# Patient Record
Sex: MALE | Race: WHITE | NOT HISPANIC OR LATINO | Employment: OTHER
[De-identification: names, ages, dates, MRNs, and addresses within clinical notes are randomized per-mention and may not be internally consistent; named-entity substitution may affect disease eponyms.]

---

## 2017-01-09 ENCOUNTER — PRIOR ORIGINAL RECORDS (OUTPATIENT)
Dept: OTHER | Age: 72
End: 2017-01-09

## 2017-01-30 RX ORDER — AMANTADINE HYDROCHLORIDE 100 MG/1
CAPSULE, GELATIN COATED ORAL
Qty: 60 CAPSULE | Refills: 3 | Status: SHIPPED | OUTPATIENT
Start: 2017-01-30 | End: 2018-09-14

## 2017-01-30 NOTE — TELEPHONE ENCOUNTER
Medication: Amantadine    Date of last refill: 07/21/16 with 5 addt refills  [place]     Last office visit: 07/21/16  Due back to clinic per last office note:  RTN in 6 months by 01/21/17  Date next office visit schedule

## 2017-02-16 ENCOUNTER — OFFICE VISIT (OUTPATIENT)
Dept: NEUROLOGY | Facility: CLINIC | Age: 72
End: 2017-02-16

## 2017-02-16 VITALS
RESPIRATION RATE: 16 BRPM | WEIGHT: 231 LBS | SYSTOLIC BLOOD PRESSURE: 138 MMHG | DIASTOLIC BLOOD PRESSURE: 86 MMHG | HEART RATE: 74 BPM | BODY MASS INDEX: 33.45 KG/M2 | HEIGHT: 69.5 IN

## 2017-02-16 DIAGNOSIS — G20 PARKINSONIAN TREMOR (HCC): Primary | ICD-10-CM

## 2017-02-16 PROCEDURE — 99213 OFFICE O/P EST LOW 20 MIN: CPT | Performed by: OTHER

## 2017-02-16 RX ORDER — ROPINIROLE 0.25 MG/1
0.25 TABLET, FILM COATED ORAL 3 TIMES DAILY
Qty: 90 TABLET | Refills: 3 | Status: SHIPPED | OUTPATIENT
Start: 2017-02-16 | End: 2017-04-06

## 2017-02-16 NOTE — PATIENT INSTRUCTIONS
Refill policies:    • Allow 2 business days for refills; controlled substances may take longer.   • Contact your pharmacy at least 5 days prior to running out of medication and have them send an electronic request or submit request through the “request re your physician has recommended that you have a procedure or additional testing performed. [facility]) will contact your insurance carrier to obtain pre-certification or prior authorization.     Unfortunately, JOSE has seen an increas

## 2017-02-16 NOTE — PROGRESS NOTES
[facility] with [address]  2/9/1945  Primary Care Provider:  Diandra Lynn MD    2/16/2017    74year old yo patient being seen for:  tremors    The new stuff did not help althoug Pulse 74  Resp 16  Ht 69.5\"  Wt 231 lb  BMI 33.64 kg/m2  Looks stated age  Pink conjunctiva anicteric sclerae, moist mucosa  No LAD, no thyromegaly  Lungs clear breath sounds  Heart S1S2, no abnormal sounds  Pink nailbeds no Cyanosis, pulses palpated    R

## 2017-04-06 RX ORDER — ROPINIROLE 0.25 MG/1
TABLET, FILM COATED ORAL
Qty: 270 TABLET | Refills: 1 | Status: SHIPPED | OUTPATIENT
Start: 2017-04-06 | End: 2017-09-25

## 2017-04-06 NOTE — TELEPHONE ENCOUNTER
Medication: Ropinorole    Date of last refill: 02/16/17 with 3 addt refills  [place]     Last office visit: 2/16/2017  Due back to clinic per last office note:  RTN in 6 months by 08/16/17  Date next office visit schedul

## 2017-04-11 ENCOUNTER — PRIOR ORIGINAL RECORDS (OUTPATIENT)
Dept: OTHER | Age: 72
End: 2017-04-11

## 2017-04-14 ENCOUNTER — TELEPHONE (OUTPATIENT)
Dept: NEUROLOGY | Facility: CLINIC | Age: 72
End: 2017-04-14

## 2017-04-24 ENCOUNTER — PRIOR ORIGINAL RECORDS (OUTPATIENT)
Dept: OTHER | Age: 72
End: 2017-04-24

## 2017-04-27 ENCOUNTER — TELEPHONE (OUTPATIENT)
Dept: NEUROLOGY | Facility: CLINIC | Age: 72
End: 2017-04-27

## 2017-04-27 ENCOUNTER — PRIOR ORIGINAL RECORDS (OUTPATIENT)
Dept: OTHER | Age: 72
End: 2017-04-27

## 2017-04-27 NOTE — TELEPHONE ENCOUNTER
Current Parkinson's patient with increasing tremors. Wife calling to update office and request a sooner evaluation appointment. Patient saw PCP office who instructed them to contact office and schedule follow up.     Patient with increasing resting trem

## 2017-05-02 NOTE — TELEPHONE ENCOUNTER
Wife calling again. Pt is having dizziness and they noticed that is a side effect of the Requip. They are wondering if they should stop the medication.   Please call as soon as possible, they have plans to go out of town next week but need to speak with yo

## 2017-05-04 ENCOUNTER — OFFICE VISIT (OUTPATIENT)
Dept: NEUROLOGY | Facility: CLINIC | Age: 72
End: 2017-05-04

## 2017-05-04 VITALS — DIASTOLIC BLOOD PRESSURE: 82 MMHG | HEART RATE: 82 BPM | SYSTOLIC BLOOD PRESSURE: 136 MMHG | RESPIRATION RATE: 18 BRPM

## 2017-05-04 DIAGNOSIS — G20 PARKINSONIAN TREMOR (HCC): Primary | ICD-10-CM

## 2017-05-04 DIAGNOSIS — G60.3 IDIOPATHIC PROGRESSIVE NEUROPATHY: ICD-10-CM

## 2017-05-04 PROCEDURE — 99213 OFFICE O/P EST LOW 20 MIN: CPT | Performed by: OTHER

## 2017-05-04 NOTE — PATIENT INSTRUCTIONS
Refill policies:    • Allow 2 business days for refills; controlled substances may take longer.   • Contact your pharmacy at least 5 days prior to running out of medication and have them send an electronic request or submit request through the “request re insurance carrier to obtain pre-certification or prior authorization. Unfortunately, JOSE has seen an increase in denial of payment even though the procedure/test has been pre-certified.   You are strongly encouraged to contact your insurance carrier to v

## 2017-05-04 NOTE — PROGRESS NOTES
[facility] with [address]  2/9/1945  Primary Care Provider:  Fanta Jaime MD    5/4/2017    74year old yo patient being seen for:  tremors    Now sees me because of \"dizziness\ medications, and the following relevant information are as noted in appropriate sections above and below.       EXAM:  /82 mmHg  Pulse 82  Resp 18  Looks stated age  Pink conjunctiva anicteric sclerae, moist mucosa  No LAD, no thyromegaly  Lungs clear

## 2017-05-09 NOTE — TELEPHONE ENCOUNTER
Medication: Carbidopa- Levodopa    Date of last refill: 05/23/16 with 1 addt refill # 90  [place]     Last office visit: 05/04/17  Due back to clinic per last office note:  RTN in 8 weeks 06/29/17  Date next office visit

## 2017-05-09 NOTE — TELEPHONE ENCOUNTER
Spoke to Nayan wife on [place] and she states that when [facility] came on office visit on 05/04/17. Per Dr Marilia Roberts. Patient to restart Carbidopa Levodopa.

## 2017-05-30 PROBLEM — R26.81 UNSTEADY GAIT: Status: ACTIVE | Noted: 2017-05-30

## 2017-05-31 ENCOUNTER — APPOINTMENT (OUTPATIENT)
Dept: LAB | Age: 72
End: 2017-05-31
Attending: Other
Payer: MEDICARE

## 2017-05-31 ENCOUNTER — OFFICE VISIT (OUTPATIENT)
Dept: NEUROLOGY | Facility: CLINIC | Age: 72
End: 2017-05-31

## 2017-05-31 DIAGNOSIS — G62.9 LARGE FIBER NEUROPATHY: Primary | ICD-10-CM

## 2017-05-31 DIAGNOSIS — G62.9 LARGE FIBER NEUROPATHY: ICD-10-CM

## 2017-05-31 PROCEDURE — 86431 RHEUMATOID FACTOR QUANT: CPT | Performed by: OTHER

## 2017-05-31 PROCEDURE — 95885 MUSC TST DONE W/NERV TST LIM: CPT | Performed by: OTHER

## 2017-05-31 PROCEDURE — 86334 IMMUNOFIX E-PHORESIS SERUM: CPT | Performed by: OTHER

## 2017-05-31 PROCEDURE — 86038 ANTINUCLEAR ANTIBODIES: CPT | Performed by: OTHER

## 2017-05-31 PROCEDURE — 84165 PROTEIN E-PHORESIS SERUM: CPT | Performed by: OTHER

## 2017-05-31 PROCEDURE — 36415 COLL VENOUS BLD VENIPUNCTURE: CPT | Performed by: OTHER

## 2017-05-31 PROCEDURE — 86140 C-REACTIVE PROTEIN: CPT | Performed by: OTHER

## 2017-05-31 PROCEDURE — 83883 ASSAY NEPHELOMETRY NOT SPEC: CPT | Performed by: OTHER

## 2017-05-31 PROCEDURE — 95909 NRV CNDJ TST 5-6 STUDIES: CPT | Performed by: OTHER

## 2017-05-31 PROCEDURE — 82607 VITAMIN B-12: CPT | Performed by: OTHER

## 2017-05-31 PROCEDURE — 85652 RBC SED RATE AUTOMATED: CPT | Performed by: OTHER

## 2017-05-31 PROCEDURE — 86255 FLUORESCENT ANTIBODY SCREEN: CPT | Performed by: OTHER

## 2017-05-31 NOTE — PROCEDURES
[facility] with [address]  [phone]    Fax  [phone]    Electrophysiologic Consultation      Patient: Asad Ly       5/31/201 Spontaneous MUAP Recruitment    IA Fib PSW Fasc H.F. Amp Dur.  PPP Pattern   R. TIB ANTERIOR N None None None None N N N N   L. PERON LONGUS N None None None None N N N N   R. GASTROCN (MED) N None None None None N N N N

## 2017-05-31 NOTE — PROGRESS NOTES
He is here for EMG to evaluate how bad his clinical neuropathy is. He complains of dizziness and has sensory ataxia legs  He also has bilateral knee replacement.   He says that his numbness in feet has been few weeks but in reality, he did not pay attentio

## 2017-07-06 ENCOUNTER — OFFICE VISIT (OUTPATIENT)
Dept: NEUROLOGY | Facility: CLINIC | Age: 72
End: 2017-07-06

## 2017-07-06 VITALS
DIASTOLIC BLOOD PRESSURE: 80 MMHG | BODY MASS INDEX: 33.3 KG/M2 | SYSTOLIC BLOOD PRESSURE: 125 MMHG | HEIGHT: 69.5 IN | RESPIRATION RATE: 16 BRPM | HEART RATE: 70 BPM | WEIGHT: 230 LBS

## 2017-07-06 DIAGNOSIS — R26.81 UNSTEADY GAIT: ICD-10-CM

## 2017-07-06 DIAGNOSIS — G20 PARKINSONIAN TREMOR (HCC): Primary | ICD-10-CM

## 2017-07-06 DIAGNOSIS — G62.9 LARGE FIBER NEUROPATHY: ICD-10-CM

## 2017-07-06 DIAGNOSIS — D89.89 KAPPA LIGHT CHAIN DISEASE (HCC): ICD-10-CM

## 2017-07-06 PROCEDURE — 99213 OFFICE O/P EST LOW 20 MIN: CPT | Performed by: OTHER

## 2017-07-06 NOTE — PROGRESS NOTES
[facility] with [address]  2/9/1945  Primary Care Provider:  Shirl Collet, MD    7/6/2017    74year old yo patient being seen for:  Parkinson disease with tremors and neuropathy - 12 mm/Hr    RHEUMATOID FACTOR      <15 IU/mL    C-REACTIVE PROTEIN      <1.00 mg/dL    Vitamin B12      193 - 986 pg/mL    YUKO SCREEN      Negative        Review of Systems:  A comprehensive 14 point review of systems was completed.   Pertinent positives no thyromegaly  Lungs clear breath sounds  Heart S1S2, no abnormal sounds  Pink nailbeds no Cyanosis, pulses palpated    Resting tremor with only a minor postural component  No bradykinesa  Slight truncal rigidity    Balance is OK      IMPRESSION & PLANS:

## 2017-07-06 NOTE — PATIENT INSTRUCTIONS
Refill policies:    • Allow 2-3 business days for refills; controlled substances may take longer.   • Contact your pharmacy at least 5 days prior to running out of medication and have them send an electronic request or submit request through the [facility] have a procedure or additional testing performed. [facility]) will contact your insurance carrier to obtain pre-certification or prior authorization.     Unfortunately, JOSE has seen an increase in denial of payment even though the p

## 2017-07-10 ENCOUNTER — APPOINTMENT (OUTPATIENT)
Dept: LAB | Age: 72
End: 2017-07-10
Attending: INTERNAL MEDICINE
Payer: MEDICARE

## 2017-07-10 PROCEDURE — 86335 IMMUNFIX E-PHORSIS/URINE/CSF: CPT

## 2017-07-10 PROCEDURE — 83883 ASSAY NEPHELOMETRY NOT SPEC: CPT

## 2017-07-10 PROCEDURE — 84156 ASSAY OF PROTEIN URINE: CPT

## 2017-07-10 PROCEDURE — 36415 COLL VENOUS BLD VENIPUNCTURE: CPT

## 2017-08-31 PROBLEM — D47.2 MGUS (MONOCLONAL GAMMOPATHY OF UNKNOWN SIGNIFICANCE): Status: ACTIVE | Noted: 2017-08-31

## 2017-08-31 PROBLEM — R76.8 ELEVATED SERUM IMMUNOGLOBULIN FREE LIGHT CHAIN LEVEL: Status: ACTIVE | Noted: 2017-08-31

## 2017-09-22 ENCOUNTER — APPOINTMENT (OUTPATIENT)
Dept: CT IMAGING | Age: 72
End: 2017-09-22
Attending: EMERGENCY MEDICINE
Payer: MEDICARE

## 2017-09-22 ENCOUNTER — HOSPITAL ENCOUNTER (EMERGENCY)
Age: 72
Discharge: HOME | End: 2017-09-22
Attending: EMERGENCY MEDICINE
Payer: MEDICARE

## 2017-09-22 VITALS
RESPIRATION RATE: 16 BRPM | BODY MASS INDEX: 31.5 KG/M2 | SYSTOLIC BLOOD PRESSURE: 135 MMHG | WEIGHT: 225 LBS | HEIGHT: 71 IN | OXYGEN SATURATION: 98 % | DIASTOLIC BLOOD PRESSURE: 90 MMHG | TEMPERATURE: 98 F | HEART RATE: 74 BPM

## 2017-09-22 DIAGNOSIS — R10.9 ABDOMINAL PAIN, RIGHT LATERAL: Primary | ICD-10-CM

## 2017-09-22 DIAGNOSIS — K40.90 UNILATERAL INGUINAL HERNIA WITHOUT OBSTRUCTION OR GANGRENE, RECURRENCE NOT SPECIFIED: ICD-10-CM

## 2017-09-22 LAB
ALBUMIN SERPL-MCNC: 3.8 G/DL (ref 3.5–4.8)
ALP LIVER SERPL-CCNC: 66 U/L (ref 45–117)
ALT SERPL-CCNC: 10 U/L (ref 17–63)
AST SERPL-CCNC: 13 U/L (ref 15–41)
BASOPHILS # BLD AUTO: 0.04 X10(3) UL (ref 0–0.1)
BASOPHILS NFR BLD AUTO: 0.5 %
BILIRUB SERPL-MCNC: 1.4 MG/DL (ref 0.1–2)
BILIRUB UR QL STRIP.AUTO: NEGATIVE
BUN BLD-MCNC: 15 MG/DL (ref 8–20)
CALCIUM BLD-MCNC: 8.6 MG/DL (ref 8.3–10.3)
CHLORIDE: 106 MMOL/L (ref 101–111)
CLARITY UR REFRACT.AUTO: CLEAR
CO2: 26 MMOL/L (ref 22–32)
COLOR UR AUTO: YELLOW
CREAT BLD-MCNC: 1.05 MG/DL (ref 0.7–1.3)
EOSINOPHIL # BLD AUTO: 0.1 X10(3) UL (ref 0–0.3)
EOSINOPHIL NFR BLD AUTO: 1.3 %
ERYTHROCYTE [DISTWIDTH] IN BLOOD BY AUTOMATED COUNT: 13.7 % (ref 11.5–16)
GLUCOSE BLD-MCNC: 100 MG/DL (ref 70–99)
GLUCOSE UR STRIP.AUTO-MCNC: NEGATIVE MG/DL
HCT VFR BLD AUTO: 48.2 % (ref 37–53)
HGB BLD-MCNC: 16.2 G/DL (ref 13–17)
IMMATURE GRANULOCYTE COUNT: 0.04 X10(3) UL (ref 0–1)
IMMATURE GRANULOCYTE RATIO %: 0.5 %
KETONES UR STRIP.AUTO-MCNC: NEGATIVE MG/DL
LEUKOCYTE ESTERASE UR QL STRIP.AUTO: NEGATIVE
LIPASE: 95 U/L (ref 73–393)
LYMPHOCYTES # BLD AUTO: 1.23 X10(3) UL (ref 0.9–4)
LYMPHOCYTES NFR BLD AUTO: 16.4 %
M PROTEIN MFR SERPL ELPH: 7.4 G/DL (ref 6.1–8.3)
MCH RBC QN AUTO: 29 PG (ref 27–33.2)
MCHC RBC AUTO-ENTMCNC: 33.6 G/DL (ref 31–37)
MCV RBC AUTO: 86.2 FL (ref 80–99)
MONOCYTES # BLD AUTO: 0.69 X10(3) UL (ref 0.1–0.6)
MONOCYTES NFR BLD AUTO: 9.2 %
NEUTROPHIL ABS PRELIM: 5.4 X10 (3) UL (ref 1.3–6.7)
NEUTROPHILS # BLD AUTO: 5.4 X10(3) UL (ref 1.3–6.7)
NEUTROPHILS NFR BLD AUTO: 72.1 %
NITRITE UR QL STRIP.AUTO: NEGATIVE
PH UR STRIP.AUTO: 6 [PH] (ref 4.5–8)
PLATELET # BLD AUTO: 181 10(3)UL (ref 150–450)
POTASSIUM SERPL-SCNC: 3.9 MMOL/L (ref 3.6–5.1)
PROT UR STRIP.AUTO-MCNC: NEGATIVE MG/DL
RBC # BLD AUTO: 5.59 X10(6)UL (ref 3.8–5.8)
RBC UR QL AUTO: NEGATIVE
RED CELL DISTRIBUTION WIDTH-SD: 43.3 FL (ref 35.1–46.3)
SODIUM SERPL-SCNC: 139 MMOL/L (ref 136–144)
SP GR UR STRIP.AUTO: 1.02 (ref 1–1.03)
UROBILINOGEN UR STRIP.AUTO-MCNC: 1 MG/DL
WBC # BLD AUTO: 7.5 X10(3) UL (ref 4–13)

## 2017-09-22 PROCEDURE — 36415 COLL VENOUS BLD VENIPUNCTURE: CPT

## 2017-09-22 PROCEDURE — 99284 EMERGENCY DEPT VISIT MOD MDM: CPT

## 2017-09-22 PROCEDURE — 74176 CT ABD & PELVIS W/O CONTRAST: CPT | Performed by: EMERGENCY MEDICINE

## 2017-09-22 PROCEDURE — 83690 ASSAY OF LIPASE: CPT | Performed by: EMERGENCY MEDICINE

## 2017-09-22 PROCEDURE — 85025 COMPLETE CBC W/AUTO DIFF WBC: CPT | Performed by: EMERGENCY MEDICINE

## 2017-09-22 PROCEDURE — 80053 COMPREHEN METABOLIC PANEL: CPT | Performed by: EMERGENCY MEDICINE

## 2017-09-22 PROCEDURE — 81003 URINALYSIS AUTO W/O SCOPE: CPT | Performed by: EMERGENCY MEDICINE

## 2017-09-22 NOTE — ED PROVIDER NOTES
Patient Seen in: THE [facility] Emergency Department In [place]    History   Patient presents with:  Abdomen/Flank Pain (GI/)    Stated Complaint: abd pain     HPI    68-year-old male who has a history of tremor in his right upper extremity.   He is treated equivalent: 1 - 2 per week     Comment: 1-2 weekly      Review of Systems    Positive for stated complaint: abd pain   Other systems are as noted in HPI. Constitutional and vital signs reviewed.       All other systems reviewed and negative except as noted Please view results for these tests on the individual orders.    URINALYSIS WITH CULTURE REFLEX   RAINBOW DRAW LAVENDER   RAINBOW DRAW LIGHT GREEN     Electrocardiogram as interpreted by this provider shows sinus rhythm and rate of 94 with no acute ST-T

## 2017-09-22 NOTE — ED INITIAL ASSESSMENT (HPI)
WHILE PLAYING GOLF THIS MORNING, EXPERIENCED SHARP RIGHT SIDED ABD PAIN AFTER TAKING A SWING. STATES PAIN ONLY WHEN TURNING THE MOTION OF A SWING.

## 2017-09-25 ENCOUNTER — OFFICE VISIT (OUTPATIENT)
Dept: SURGERY | Facility: CLINIC | Age: 72
End: 2017-09-25

## 2017-09-25 VITALS
HEIGHT: 71 IN | DIASTOLIC BLOOD PRESSURE: 91 MMHG | HEART RATE: 73 BPM | BODY MASS INDEX: 31.5 KG/M2 | RESPIRATION RATE: 16 BRPM | WEIGHT: 225 LBS | SYSTOLIC BLOOD PRESSURE: 134 MMHG

## 2017-09-25 DIAGNOSIS — K40.20 NON-RECURRENT BILATERAL INGUINAL HERNIA WITHOUT OBSTRUCTION OR GANGRENE: Primary | ICD-10-CM

## 2017-09-25 PROCEDURE — 99204 OFFICE O/P NEW MOD 45 MIN: CPT | Performed by: COLON & RECTAL SURGERY

## 2017-09-25 RX ORDER — ROPINIROLE 0.25 MG/1
TABLET, FILM COATED ORAL
Qty: 270 TABLET | Refills: 1 | Status: SHIPPED | OUTPATIENT
Start: 2017-09-25 | End: 2019-04-02

## 2017-09-25 NOTE — H&P
New Patient Visit Note       Active Problems      1.  Non-recurrent bilateral inguinal hernia without obstruction or gangrene        Chief Complaint   Right lower quadrant abdominal pain    History of Present Illness   Ted Duffy is a 49-year-old gentlem obstruction and other lower urinary tract symptoms (LUTS)    • Impotence of organic origin    • KIDNEY STONE    • MGUS (monoclonal gammopathy of unknown significance) 8/31/2017   • Nonspecific abnormal results of cardiovascular function study 11/2006    Ca 90 tablet Rfl: 0   FINASTERIDE 5 MG Oral Tab Take 1 tablet by mouth  daily Disp: 90 tablet Rfl: 0   CARBIDOPA-LEVODOPA  MG Oral Tab TAKE 1 TABLET BY MOUTH THREE TIMES DAILY Disp: 90 tablet Rfl: 5   AMANTADINE  MG Oral Cap TAKE 1 CAPSULE(100 MG well kempt, elderly, obese  Eyes: PERRL, no scleral icterus  ENT: Nares moist, oropharynx clear  Neck: Supple, no tracheal deviation   Cardiac: Irregularly irregular rate and rhythm, no murmurs  Respiratory: No respiratory distress, breath sounds clear stacy gallbladder is noted. PANCREAS:  Normal.  No lesion, fluid collection, ductal dilatation, or atrophy. SPLEEN:  Calcified granulomas and spleen are noted. KIDNEYS:  7 mm and 6 mm nonobstructing calculi left kidney are noted.  5 mm calculus nonobstructin abdomen and pelvis obtained on 9/22/2017. I agree with the radiologist interpretation. Are bilateral inguinal hernias. The right is greater than left.   Right inguinal hernia appears to contain a portion of the cecum and possibly the ileocecal valve, lef Referrals   None    Follow Up  Return for Follow up after procedure.     Dinh Caro MD

## 2017-09-25 NOTE — TELEPHONE ENCOUNTER
Medication: Ropinirole 0.25 mg    Date of last refill: 04/06/17 with 1 addt refill # 270  [place]     Last office visit: 7/6/2017  Due back to clinic per last office note:  RTN in 6 months  Date next office visit schedul

## 2017-09-25 NOTE — PATIENT INSTRUCTIONS
Assessment   Non-recurrent bilateral inguinal hernia without obstruction or gangrene  (primary encounter diagnosis)  I personally reviewed the images of the CT of the abdomen and pelvis obtained on 9/22/2017. I agree with the radiologist interpretation.

## 2017-09-28 ENCOUNTER — PRIOR ORIGINAL RECORDS (OUTPATIENT)
Dept: OTHER | Age: 72
End: 2017-09-28

## 2017-12-04 NOTE — TELEPHONE ENCOUNTER
Medication: Carbidopa-Levodopa  mg     Date of last refill: 05/09/17 with 5 addt refills  [place]      Last office visit: 7/6/2017  Due back to clinic per last office note:  RTN in 6 months  Date next office visit

## 2017-12-18 ENCOUNTER — OFFICE VISIT (OUTPATIENT)
Dept: NEUROLOGY | Facility: CLINIC | Age: 72
End: 2017-12-18

## 2017-12-18 VITALS
RESPIRATION RATE: 16 BRPM | DIASTOLIC BLOOD PRESSURE: 80 MMHG | BODY MASS INDEX: 31.5 KG/M2 | HEART RATE: 82 BPM | SYSTOLIC BLOOD PRESSURE: 138 MMHG | HEIGHT: 71 IN | WEIGHT: 225 LBS

## 2017-12-18 DIAGNOSIS — G20 PARKINSONIAN TREMOR (HCC): Primary | ICD-10-CM

## 2017-12-18 DIAGNOSIS — R26.81 UNSTEADY GAIT: ICD-10-CM

## 2017-12-18 PROCEDURE — 99214 OFFICE O/P EST MOD 30 MIN: CPT | Performed by: OTHER

## 2017-12-18 RX ORDER — ROPINIROLE 0.5 MG/1
0.5 TABLET, FILM COATED ORAL 3 TIMES DAILY
Qty: 270 TABLET | Refills: 4 | Status: SHIPPED | OUTPATIENT
Start: 2017-12-18 | End: 2018-01-31 | Stop reason: DRUGHIGH

## 2017-12-18 NOTE — PROGRESS NOTES
[employer] with [address]  2/9/1945  Primary Care Provider:  Merissa Berry MD    12/18/2017    74year old yo patient being seen for:  PD with prominent resting tremor    No falls Looks stated age  Pink conjunctiva anicteric sclerae, moist mucosa  No LAD, no thyromegaly  Lungs clear breath sounds  Heart S1S2, no abnormal sounds  Pink nailbeds no Cyanosis, pulses palpated    Resting tremor  Not much bradykinesia  Stooped posture  T

## 2018-01-29 ENCOUNTER — TELEPHONE (OUTPATIENT)
Dept: NEUROLOGY | Facility: CLINIC | Age: 73
End: 2018-01-29

## 2018-01-29 NOTE — TELEPHONE ENCOUNTER
Wife states that patient's tremors are actually worsened. Asking to increase Ropinirole dose. He is also having delusions once weekly which is new. Patient is accusing her of having an affair. Supportive conversation had.   Will mention this the

## 2018-01-29 NOTE — TELEPHONE ENCOUNTER
LMTCB    Patient is on Ropinirole 0.5mg, per last office visit on 12/18 with Dr. Celina Hicks may increase dosage to 1mg. LM to obtain patients symptoms.

## 2018-01-30 NOTE — TELEPHONE ENCOUNTER
Spoke with wife and advised to reduce ropinirole back to 0.25 mg 3 times a day. Tremors did not change. His ability to stand and walk is still steady anyway.     Theodora Esparza MD  Vascular & General Neurology  Director, Multiple Sclerosis Program  [facility]

## 2018-02-01 ENCOUNTER — PRIOR ORIGINAL RECORDS (OUTPATIENT)
Dept: OTHER | Age: 73
End: 2018-02-01

## 2018-02-01 PROCEDURE — 83883 ASSAY NEPHELOMETRY NOT SPEC: CPT | Performed by: INTERNAL MEDICINE

## 2018-02-01 PROCEDURE — 86334 IMMUNOFIX E-PHORESIS SERUM: CPT | Performed by: INTERNAL MEDICINE

## 2018-02-01 PROCEDURE — 84165 PROTEIN E-PHORESIS SERUM: CPT | Performed by: INTERNAL MEDICINE

## 2018-02-06 ENCOUNTER — APPOINTMENT (OUTPATIENT)
Dept: LAB | Facility: HOSPITAL | Age: 73
End: 2018-02-06
Payer: MEDICARE

## 2018-02-06 DIAGNOSIS — K40.90 NON-RECURRENT UNILATERAL INGUINAL HERNIA WITHOUT OBSTRUCTION OR GANGRENE: ICD-10-CM

## 2018-02-06 LAB
ATRIAL RATE: 357 BPM
BUN BLD-MCNC: 16 MG/DL (ref 8–20)
CALCIUM BLD-MCNC: 9 MG/DL (ref 8.3–10.3)
CHLORIDE: 106 MMOL/L (ref 101–111)
CO2: 29 MMOL/L (ref 22–32)
CREAT BLD-MCNC: 1.08 MG/DL (ref 0.7–1.3)
GLUCOSE BLD-MCNC: 107 MG/DL (ref 70–99)
POTASSIUM SERPL-SCNC: 4.1 MMOL/L (ref 3.6–5.1)
Q-T INTERVAL: 372 MS
QRS DURATION: 84 MS
QTC CALCULATION (BEZET): 404 MS
R AXIS: 67 DEGREES
SODIUM SERPL-SCNC: 141 MMOL/L (ref 136–144)
T AXIS: 3 DEGREES
VENTRICULAR RATE: 71 BPM

## 2018-02-06 PROCEDURE — 36415 COLL VENOUS BLD VENIPUNCTURE: CPT

## 2018-02-06 PROCEDURE — 80048 BASIC METABOLIC PNL TOTAL CA: CPT

## 2018-02-06 PROCEDURE — 93005 ELECTROCARDIOGRAM TRACING: CPT

## 2018-02-06 PROCEDURE — 93010 ELECTROCARDIOGRAM REPORT: CPT | Performed by: INTERNAL MEDICINE

## 2018-02-13 ENCOUNTER — PRIOR ORIGINAL RECORDS (OUTPATIENT)
Dept: OTHER | Age: 73
End: 2018-02-13

## 2018-02-19 LAB
ALBUMIN: 4.1 G/DL
ALKALINE PHOSPHATATE(ALK PHOS): 59 IU/L
BILIRUBIN TOTAL: 1.25 MG/DL
BUN: 15 MG/DL
CALCIUM: 9.3 MG/DL
CHLORIDE: 103 MEQ/L
CREATININE, SERUM: 1 MG/DL
GLUCOSE: 99 MG/DL
HEMATOCRIT: 48.9 %
HEMOGLOBIN: 16.6 G/DL
PLATELETS: 202 K/UL
POTASSIUM, SERUM: 4.2 MEQ/L
PROTEIN, TOTAL: 7 G/DL
RED BLOOD COUNT: 5.61 X 10-6/U
SGOT (AST): 18 IU/L
SGPT (ALT): 11 IU/L
SODIUM: 142 MEQ/L
WHITE BLOOD COUNT: 7.7 X 10-3/U

## 2018-03-26 ENCOUNTER — SURGERY (OUTPATIENT)
Age: 73
End: 2018-03-26

## 2018-03-26 ENCOUNTER — ANESTHESIA (OUTPATIENT)
Dept: SURGERY | Facility: HOSPITAL | Age: 73
End: 2018-03-26
Payer: MEDICARE

## 2018-03-26 ENCOUNTER — HOSPITAL ENCOUNTER (OUTPATIENT)
Facility: HOSPITAL | Age: 73
Setting detail: HOSPITAL OUTPATIENT SURGERY
Discharge: HOME | End: 2018-03-26
Attending: SURGERY | Admitting: SURGERY
Payer: MEDICARE

## 2018-03-26 ENCOUNTER — ANESTHESIA EVENT (OUTPATIENT)
Dept: SURGERY | Facility: HOSPITAL | Age: 73
End: 2018-03-26
Payer: MEDICARE

## 2018-03-26 VITALS
HEIGHT: 71 IN | WEIGHT: 222 LBS | BODY MASS INDEX: 31.08 KG/M2 | RESPIRATION RATE: 17 BRPM | TEMPERATURE: 98 F | SYSTOLIC BLOOD PRESSURE: 108 MMHG | OXYGEN SATURATION: 96 % | DIASTOLIC BLOOD PRESSURE: 70 MMHG | HEART RATE: 56 BPM

## 2018-03-26 DIAGNOSIS — K40.90 NON-RECURRENT UNILATERAL INGUINAL HERNIA WITHOUT OBSTRUCTION OR GANGRENE: ICD-10-CM

## 2018-03-26 PROCEDURE — 8E0W4CZ ROBOTIC ASSISTED PROCEDURE OF TRUNK REGION, PERCUTANEOUS ENDOSCOPIC APPROACH: ICD-10-PCS | Performed by: SURGERY

## 2018-03-26 PROCEDURE — 0YU54JZ SUPPLEMENT RIGHT INGUINAL REGION WITH SYNTHETIC SUBSTITUTE, PERCUTANEOUS ENDOSCOPIC APPROACH: ICD-10-PCS | Performed by: SURGERY

## 2018-03-26 DEVICE — PROGRIP MESH: Type: IMPLANTABLE DEVICE | Site: INGUINAL | Status: FUNCTIONAL

## 2018-03-26 RX ORDER — NALOXONE HYDROCHLORIDE 0.4 MG/ML
80 INJECTION, SOLUTION INTRAMUSCULAR; INTRAVENOUS; SUBCUTANEOUS AS NEEDED
Status: DISCONTINUED | OUTPATIENT
Start: 2018-03-26 | End: 2018-03-26

## 2018-03-26 RX ORDER — SODIUM CHLORIDE, SODIUM LACTATE, POTASSIUM CHLORIDE, CALCIUM CHLORIDE 600; 310; 30; 20 MG/100ML; MG/100ML; MG/100ML; MG/100ML
INJECTION, SOLUTION INTRAVENOUS CONTINUOUS
Status: DISCONTINUED | OUTPATIENT
Start: 2018-03-26 | End: 2018-03-26

## 2018-03-26 RX ORDER — HYDROCODONE BITARTRATE AND ACETAMINOPHEN 5; 325 MG/1; MG/1
2 TABLET ORAL AS NEEDED
Status: COMPLETED | OUTPATIENT
Start: 2018-03-26 | End: 2018-03-26

## 2018-03-26 RX ORDER — MEPERIDINE HYDROCHLORIDE 25 MG/ML
INJECTION INTRAMUSCULAR; INTRAVENOUS; SUBCUTANEOUS
Status: COMPLETED
Start: 2018-03-26 | End: 2018-03-26

## 2018-03-26 RX ORDER — HYDROCODONE BITARTRATE AND ACETAMINOPHEN 5; 325 MG/1; MG/1
1-2 TABLET ORAL EVERY 6 HOURS PRN
Qty: 30 TABLET | Refills: 0 | Status: SHIPPED | OUTPATIENT
Start: 2018-03-26 | End: 2018-08-02

## 2018-03-26 RX ORDER — MIDAZOLAM HYDROCHLORIDE 1 MG/ML
1 INJECTION INTRAMUSCULAR; INTRAVENOUS EVERY 5 MIN PRN
Status: DISCONTINUED | OUTPATIENT
Start: 2018-03-26 | End: 2018-03-26

## 2018-03-26 RX ORDER — ONDANSETRON 2 MG/ML
4 INJECTION INTRAMUSCULAR; INTRAVENOUS AS NEEDED
Status: DISCONTINUED | OUTPATIENT
Start: 2018-03-26 | End: 2018-03-26

## 2018-03-26 RX ORDER — HEPARIN SODIUM 5000 [USP'U]/ML
5000 INJECTION, SOLUTION INTRAVENOUS; SUBCUTANEOUS ONCE
Status: COMPLETED | OUTPATIENT
Start: 2018-03-26 | End: 2018-03-26

## 2018-03-26 RX ORDER — CEFAZOLIN SODIUM/WATER 2 G/20 ML
2 SYRINGE (ML) INTRAVENOUS ONCE
Status: DISCONTINUED | OUTPATIENT
Start: 2018-03-26 | End: 2018-03-26 | Stop reason: HOSPADM

## 2018-03-26 RX ORDER — MEPERIDINE HYDROCHLORIDE 25 MG/ML
12.5 INJECTION INTRAMUSCULAR; INTRAVENOUS; SUBCUTANEOUS AS NEEDED
Status: COMPLETED | OUTPATIENT
Start: 2018-03-26 | End: 2018-03-26

## 2018-03-26 RX ORDER — BUPIVACAINE HYDROCHLORIDE AND EPINEPHRINE 5; 5 MG/ML; UG/ML
INJECTION, SOLUTION EPIDURAL; INTRACAUDAL; PERINEURAL AS NEEDED
Status: DISCONTINUED | OUTPATIENT
Start: 2018-03-26 | End: 2018-03-26 | Stop reason: HOSPADM

## 2018-03-26 RX ORDER — HYDROCODONE BITARTRATE AND ACETAMINOPHEN 5; 325 MG/1; MG/1
1 TABLET ORAL AS NEEDED
Status: COMPLETED | OUTPATIENT
Start: 2018-03-26 | End: 2018-03-26

## 2018-03-26 NOTE — ANESTHESIA PREPROCEDURE EVALUATION
PRE-OP EVALUATION    Patient Name: Joe Lemus    Pre-op Diagnosis: Non-recurrent unilateral inguinal hernia without obstruction or gangrene [K40.90]    Procedure(s):  ROBOTIC ASSISTED RIGHT INGUINAL HERNIA REPAIR WITH MESH. POSSIBLE LEFT INGUINAL HERNIA Neuro/Psych                              MGUS  parkinsons      Past Surgical History:  06/2014: CARDIOVERSION  11/28/2005: COLONOSCOPY  10/14/2009: KNEE REPLACEMENT SURGERY      Comment: left knee  12/2/2009: KNEE REPLACEMENT SURGERY      Comment: right

## 2018-03-26 NOTE — OPERATIVE REPORT
Report of 10 [facility] Patient Status:  Hospital Outpatient Surgery    1945 MRN [id number]   [facility] SURGERY Attending Andrea Nichols MD   [facility] Day # 0 PCP Pedro Pablo Wilkinson MD       3/26/2018    [address] [facility] [address] all ports were removed. Skin incisions were closed with 4-0 Vicryl in a subcuticular fashion. The wounds were infiltrated with local anesthesia, Steri-Strips, sterilely dressed. The patient tolerated the procedure well.     Chastity Freitas  3/26/2018

## 2018-03-26 NOTE — H&P
HPI:     Jordan Paredes is a 74year old male who presents for evaluation of a bilateral inguinal hernia. Patient describes a bulge and mild discomfort especially with physical activity.  Patient denies any symptoms of obstruction or strangulation.      Liz 0.25 MG Oral Tab TAKE 1 TABLET BY MOUTH 3  TIMES DAILY Disp: 270 tablet Rfl: 1   AMANTADINE  MG Oral Cap TAKE 1 CAPSULE(100 MG) BY MOUTH TWICE DAILY Disp: 60 capsule Rfl: 3   Metoprolol Succinate ER 25 MG Oral Tablet 24 Hr Take 1 tablet (25 mg total recurrent hernia,conversion to an open procedure, shoulder pain, DVT, PE and MI.  We also discussed the possibility of contralateral hernia repair if discovered incidentally at the time of surgery.     Hernia surgery booklet given to patient and questions a

## 2018-03-26 NOTE — ANESTHESIA POSTPROCEDURE EVALUATION
[facility]    Karena Montgomeryrajni Patient Status:  Hospital Outpatient Surgery   Age/Gender 74year old male MRN [id number]   Location [address] Attending Caryn Ferrer MD   Hosp Day # 0 PCP Yuri Vergara MD       Anesthesia Po

## 2018-05-10 ENCOUNTER — PRIOR ORIGINAL RECORDS (OUTPATIENT)
Dept: OTHER | Age: 73
End: 2018-05-10

## 2018-05-23 ENCOUNTER — LAB ENCOUNTER (OUTPATIENT)
Dept: LAB | Age: 73
End: 2018-05-23
Attending: INTERNAL MEDICINE
Payer: MEDICARE

## 2018-05-23 ENCOUNTER — PRIOR ORIGINAL RECORDS (OUTPATIENT)
Dept: OTHER | Age: 73
End: 2018-05-23

## 2018-05-23 DIAGNOSIS — I10 ESSENTIAL HYPERTENSION, BENIGN: ICD-10-CM

## 2018-05-23 DIAGNOSIS — R79.89 HYPOURICEMIA: ICD-10-CM

## 2018-05-23 DIAGNOSIS — I65.29 CAROTID ARTERY STENOSIS: ICD-10-CM

## 2018-05-23 DIAGNOSIS — E78.00 PURE HYPERCHOLESTEROLEMIA: Primary | ICD-10-CM

## 2018-05-23 DIAGNOSIS — I48.20 CHRONIC ATRIAL FIBRILLATION (HCC): ICD-10-CM

## 2018-05-23 PROCEDURE — 80053 COMPREHEN METABOLIC PANEL: CPT

## 2018-05-23 PROCEDURE — 83036 HEMOGLOBIN GLYCOSYLATED A1C: CPT

## 2018-05-23 PROCEDURE — 80061 LIPID PANEL: CPT

## 2018-05-31 ENCOUNTER — PRIOR ORIGINAL RECORDS (OUTPATIENT)
Dept: OTHER | Age: 73
End: 2018-05-31

## 2018-05-31 LAB
ALBUMIN: 3.7 G/DL
ALKALINE PHOSPHATATE(ALK PHOS): 73 IU/L
BILIRUBIN TOTAL: 0.9 MG/DL
BUN: 16 MG/DL
CALCIUM: 9.3 MG/DL
CHLORIDE: 105 MEQ/L
CHOLESTEROL, TOTAL: 158 MG/DL
CREATININE, SERUM: 1.08 MG/DL
GLUCOSE: 99 MG/DL
HDL CHOLESTEROL: 51 MG/DL
HEMOGLOBIN A1C: 5.6 %
LDL CHOLESTEROL: 90 MG/DL
NON-HDL CHOLESTEROL: 107 MG/DL
POTASSIUM, SERUM: 3.9 MEQ/L
PROTEIN, TOTAL: 7.3 G/DL
SGOT (AST): 19 IU/L
SGPT (ALT): 22 IU/L
SODIUM: 140 MEQ/L
TRIGLYCERIDES: 84 MG/DL

## 2018-06-11 NOTE — TELEPHONE ENCOUNTER
Medication: Carbidopa-Levodopa 25-100mg    Date of last refill: 12/04/17 with 5 addt refills  [place]     Last office visit: 12/18/2017  Due back to clinic per last office note:  RTN in 7 months  Date next office visit s Return in about 7 months (around 7/18/2018).

## 2018-06-12 ENCOUNTER — OFFICE VISIT (OUTPATIENT)
Dept: NEUROLOGY | Facility: CLINIC | Age: 73
End: 2018-06-12

## 2018-06-12 ENCOUNTER — TELEPHONE (OUTPATIENT)
Dept: NEUROLOGY | Facility: CLINIC | Age: 73
End: 2018-06-12

## 2018-06-12 VITALS
BODY MASS INDEX: 30.38 KG/M2 | HEIGHT: 71 IN | HEART RATE: 84 BPM | WEIGHT: 217 LBS | SYSTOLIC BLOOD PRESSURE: 127 MMHG | RESPIRATION RATE: 16 BRPM | DIASTOLIC BLOOD PRESSURE: 74 MMHG

## 2018-06-12 DIAGNOSIS — G20 PARKINSONIAN TREMOR (HCC): ICD-10-CM

## 2018-06-12 DIAGNOSIS — R26.81 UNSTEADY GAIT: Primary | ICD-10-CM

## 2018-06-12 PROCEDURE — 99214 OFFICE O/P EST MOD 30 MIN: CPT | Performed by: OTHER

## 2018-06-12 NOTE — PATIENT INSTRUCTIONS
Refill policies:    • Allow 2-3 business days for refills; controlled substances may take longer.   • Contact your pharmacy at least 5 days prior to running out of medication and have them send an electronic request or submit request through the “request re entire amount billed. Precertification and Prior Authorizations: If your physician has recommended that you have a procedure or additional testing performed.   [facility]) will contact your insurance carrier to obtain pre-certi

## 2018-06-12 NOTE — PROGRESS NOTES
[facility] with [address]  2/9/1945  Primary Care Provider:  Emily Antoine MD    6/12/2018  Accompanied visit:  ( ) No (wife) yes    74year old yo patient being seen for:  PD appropriate sections above and below.       EXAM:  /74 (BP Location: Left arm, Patient Position: Sitting, Cuff Size: large)   Pulse 84   Resp 16   Ht 71\"   Wt 217 lb   BMI 30.27 kg/m²   Looks stated age  Pink conjunctiva anicteric sclerae, moist muco other records reviewed --non F2F  (  ) [facility] meetings - patient not present --non F2F  Non Face to Face CPT code 78505/84631 applies as documented above    PROCEDURE DONE     (   ) see notes  Visits are done with exam room open doors and windows except when

## 2018-06-12 NOTE — TELEPHONE ENCOUNTER
From completed for patient. To be reviewed and signed by provider. Will mail to  when completed. Copy to scanning.

## 2018-06-14 ENCOUNTER — TELEPHONE (OUTPATIENT)
Dept: NEUROLOGY | Facility: CLINIC | Age: 73
End: 2018-06-14

## 2018-06-14 NOTE — TELEPHONE ENCOUNTER
PT initial examination received from [facility] for physician review and signature. Patient receives PT for Parkinson's disease. Frequency: twice weekly    Duration: 12 weeks, 6/12/2018 to 9/3/2018    Plan of Care:  Therapy to address poor balanc

## 2018-07-03 ENCOUNTER — PRIOR ORIGINAL RECORDS (OUTPATIENT)
Dept: OTHER | Age: 73
End: 2018-07-03

## 2018-07-10 ENCOUNTER — PRIOR ORIGINAL RECORDS (OUTPATIENT)
Dept: OTHER | Age: 73
End: 2018-07-10

## 2018-07-12 DIAGNOSIS — N40.0 BENIGN PROSTATIC HYPERPLASIA WITHOUT LOWER URINARY TRACT SYMPTOMS: Primary | Chronic | ICD-10-CM

## 2018-07-12 NOTE — TELEPHONE ENCOUNTER
Medication: Tamsulosin 0.4 mg    Date of last refill: 11/01/16 with 3 addt refills  [place]     Last office visit: 6/12/2018  Due back to clinic per last office note:  RTN in 3 months  Date next office visit scheduled:

## 2018-07-13 RX ORDER — TAMSULOSIN HYDROCHLORIDE 0.4 MG/1
0.4 CAPSULE ORAL DAILY
Qty: 90 CAPSULE | Refills: 0 | Status: SHIPPED | OUTPATIENT
Start: 2018-07-13 | End: 2018-09-24

## 2018-07-18 ENCOUNTER — TELEPHONE (OUTPATIENT)
Dept: NEUROLOGY | Facility: CLINIC | Age: 73
End: 2018-07-18

## 2018-07-18 NOTE — TELEPHONE ENCOUNTER
PT progress note reviewed by Dr. Mirela Madrid and signed. Plan is to continue PT 2x per week for the next 12 weeks. Was faxed by alternative staff member, confirmation received.

## 2018-08-02 ENCOUNTER — PRIOR ORIGINAL RECORDS (OUTPATIENT)
Dept: OTHER | Age: 73
End: 2018-08-02

## 2018-08-02 PROCEDURE — 86334 IMMUNOFIX E-PHORESIS SERUM: CPT | Performed by: PHYSICIAN ASSISTANT

## 2018-08-02 PROCEDURE — 84165 PROTEIN E-PHORESIS SERUM: CPT | Performed by: PHYSICIAN ASSISTANT

## 2018-08-02 PROCEDURE — 83883 ASSAY NEPHELOMETRY NOT SPEC: CPT | Performed by: PHYSICIAN ASSISTANT

## 2018-08-08 ENCOUNTER — PRIOR ORIGINAL RECORDS (OUTPATIENT)
Dept: OTHER | Age: 73
End: 2018-08-08

## 2018-09-13 ENCOUNTER — OFFICE VISIT (OUTPATIENT)
Dept: NEUROLOGY | Facility: CLINIC | Age: 73
End: 2018-09-13
Payer: COMMERCIAL

## 2018-09-13 VITALS
HEART RATE: 74 BPM | DIASTOLIC BLOOD PRESSURE: 78 MMHG | BODY MASS INDEX: 30.24 KG/M2 | HEIGHT: 71 IN | RESPIRATION RATE: 16 BRPM | SYSTOLIC BLOOD PRESSURE: 126 MMHG | WEIGHT: 216 LBS

## 2018-09-13 DIAGNOSIS — G20 PARKINSON DISEASE (HCC): Primary | ICD-10-CM

## 2018-09-13 PROBLEM — G20.A1 PARKINSON DISEASE (HCC): Status: ACTIVE | Noted: 2018-09-13

## 2018-09-13 PROCEDURE — 99214 OFFICE O/P EST MOD 30 MIN: CPT | Performed by: OTHER

## 2018-09-13 RX ORDER — ZONISAMIDE 25 MG/1
25 CAPSULE ORAL 2 TIMES DAILY
Qty: 60 CAPSULE | Refills: 5 | Status: SHIPPED | OUTPATIENT
Start: 2018-09-13 | End: 2019-03-04

## 2018-09-13 NOTE — PATIENT INSTRUCTIONS
Refill policies:    • Allow 2-3 business days for refills; controlled substances may take longer.   • Contact your pharmacy at least 5 days prior to running out of medication and have them send an electronic request or submit request through the “request re entire amount billed. Precertification and Prior Authorizations: If your physician has recommended that you have a procedure or additional testing performed.   [facility]) will contact your insurance carrier to obtain pre-certi

## 2018-09-13 NOTE — PROGRESS NOTES
[facility] with [address]  2/9/1945  Primary Care Provider:  Fanta Jaime MD    9/13/2018  Accompanied visit:  ( ) No (x) yes    74year old yo patient being seen for:  PD    No following items were reviewed: medications, and the following relevant information are as noted in appropriate sections above and below.       EXAM:  /78 (BP Location: Left arm, Patient Position: Sitting, Cuff Size: adult)   Pulse 74   Resp 16   Ht 71 discussed with other caregivers - -non F2F  (  ) Telephone time with patiern or authorized [employer]  (  ) other records reviewed --non F2F  (  ) Fam meetings - patient not present --non F2F  Non Face to Face CPT code 20915/27491 applies as docum

## 2018-09-14 ENCOUNTER — TELEPHONE (OUTPATIENT)
Dept: NEUROLOGY | Facility: CLINIC | Age: 73
End: 2018-09-14

## 2018-09-14 NOTE — TELEPHONE ENCOUNTER
Called neither Licha Rutledge or Kristen Morris answering. Sent Text message to them that OK to proceed with Zonisamide.   Dr Jhoan Faulkner

## 2018-09-14 NOTE — TELEPHONE ENCOUNTER
Az Mathews reports that Arnol Sales has not been on amantadine since May 4, 2017. She wanted to make sure the changes he recommended (d/c amantadine, start zonisamide) were appropriate. Will update Dr. Belinda Villalobos. Epic updated to reflect Sanjiv's report.

## 2019-01-22 ENCOUNTER — PRIOR ORIGINAL RECORDS (OUTPATIENT)
Dept: OTHER | Age: 74
End: 2019-01-22

## 2019-01-22 ENCOUNTER — MYAURORA ACCOUNT LINK (OUTPATIENT)
Dept: OTHER | Age: 74
End: 2019-01-22

## 2019-01-22 LAB
ALBUMIN: 3.6 G/DL
ALKALINE PHOSPHATATE(ALK PHOS): 78 IU/L
BILIRUBIN TOTAL: 1.37 MG/DL
BUN: 18 MG/DL
CALCIUM: 9 MG/DL
CHLORIDE: 101 MEQ/L
CREATININE, SERUM: 1.3 MG/DL
GLUCOSE: 103 MG/DL
POTASSIUM, SERUM: 3.9 MEQ/L
PROTEIN, TOTAL: 7.2 G/DL
SGOT (AST): 22 IU/L
SGPT (ALT): 10 IU/L
SODIUM: 138 MEQ/L

## 2019-02-01 LAB
HEMATOCRIT: 48.2 %
HEMOGLOBIN: 16.3 G/DL
PLATELETS: 175 K/UL
RED BLOOD COUNT: 5.87 X 10-6/U
WHITE BLOOD COUNT: 6.16 X 10-3/U

## 2019-02-28 VITALS
DIASTOLIC BLOOD PRESSURE: 80 MMHG | WEIGHT: 218 LBS | HEIGHT: 71 IN | BODY MASS INDEX: 30.52 KG/M2 | HEART RATE: 64 BPM | SYSTOLIC BLOOD PRESSURE: 116 MMHG

## 2019-02-28 VITALS
HEIGHT: 71 IN | SYSTOLIC BLOOD PRESSURE: 110 MMHG | WEIGHT: 219 LBS | HEART RATE: 70 BPM | DIASTOLIC BLOOD PRESSURE: 66 MMHG | BODY MASS INDEX: 30.66 KG/M2

## 2019-02-28 VITALS
HEIGHT: 71 IN | HEART RATE: 63 BPM | WEIGHT: 225 LBS | SYSTOLIC BLOOD PRESSURE: 106 MMHG | BODY MASS INDEX: 31.5 KG/M2 | DIASTOLIC BLOOD PRESSURE: 64 MMHG

## 2019-03-01 VITALS
WEIGHT: 228 LBS | HEART RATE: 64 BPM | BODY MASS INDEX: 31.92 KG/M2 | SYSTOLIC BLOOD PRESSURE: 132 MMHG | DIASTOLIC BLOOD PRESSURE: 74 MMHG | HEIGHT: 71 IN

## 2019-03-04 RX ORDER — ZONISAMIDE 25 MG/1
CAPSULE ORAL
Qty: 60 CAPSULE | Refills: 0 | Status: SHIPPED | OUTPATIENT
Start: 2019-03-04 | End: 2019-03-31

## 2019-03-04 NOTE — TELEPHONE ENCOUNTER
Medication: zonisamide 25 MG Oral Cap    Date of last refill: 9/13/18 (60/5)  [place]     Last office visit: 9/13/2018  Due back to clinic per last office note:  6- 8 months  Date next office visit scheduled:  3/12/19  F

## 2019-03-05 ENCOUNTER — TELEPHONE (OUTPATIENT)
Dept: CARDIOLOGY | Age: 74
End: 2019-03-05

## 2019-03-12 ENCOUNTER — OFFICE VISIT (OUTPATIENT)
Dept: NEUROLOGY | Facility: CLINIC | Age: 74
End: 2019-03-12
Payer: COMMERCIAL

## 2019-03-12 VITALS — HEART RATE: 78 BPM | SYSTOLIC BLOOD PRESSURE: 130 MMHG | DIASTOLIC BLOOD PRESSURE: 70 MMHG

## 2019-03-12 DIAGNOSIS — G20 PARKINSON DISEASE (HCC): ICD-10-CM

## 2019-03-12 DIAGNOSIS — G60.3 IDIOPATHIC PROGRESSIVE NEUROPATHY: ICD-10-CM

## 2019-03-12 DIAGNOSIS — G20 PARKINSONIAN TREMOR (HCC): Primary | ICD-10-CM

## 2019-03-12 PROCEDURE — 99213 OFFICE O/P EST LOW 20 MIN: CPT | Performed by: OTHER

## 2019-03-12 NOTE — PROGRESS NOTES
[facility] with [address]  2/9/1945  Primary Care Provider:  Jyothi Boone MD    3/12/2019  Accompanied visit:  ( ) No (x) yes, by: Wife    66year old yo patient being seen for: LABS and other DATA reviewed.        Problem/s Identified this visit:   Parkinsonian tremor ([facility] [address]  (primary encounter diagnosis)  [facility]  Idiopathic progressive neuropathy    Discussion on the case:  Same meds  Stop zonisamide      Diagnost

## 2019-04-01 RX ORDER — ZONISAMIDE 25 MG/1
CAPSULE ORAL
Qty: 60 CAPSULE | Refills: 5 | Status: SHIPPED | OUTPATIENT
Start: 2019-04-01 | End: 2019-04-02

## 2019-04-01 NOTE — TELEPHONE ENCOUNTER
Medication: Zonisamide 25 mg    Date of last refill:.03/04/19  [place]     Last office visit: 3/12/2019  Due back to clinic per last office note:  RTN in 8 months  Date next office visit scheduled:    Future Appointments Female patient visits were with done with NP, PA or MA/PSRs during the entirety of the visit                     Instructions         Return in about 6 months (around 9/12/2019).

## 2019-04-02 PROBLEM — G62.9 LARGE FIBER NEUROPATHY: Chronic | Status: ACTIVE | Noted: 2017-05-31

## 2019-04-12 RX ORDER — FINASTERIDE 5 MG/1
TABLET, FILM COATED ORAL
COMMUNITY

## 2019-04-12 RX ORDER — METOPROLOL SUCCINATE 25 MG
TABLET, EXTENDED RELEASE 24 HR ORAL
COMMUNITY

## 2019-04-12 RX ORDER — ATORVASTATIN CALCIUM 20 MG/1
TABLET, FILM COATED ORAL
COMMUNITY

## 2019-04-12 RX ORDER — LOSARTAN POTASSIUM 50 MG/1
TABLET ORAL
COMMUNITY

## 2019-04-25 ENCOUNTER — LAB ENCOUNTER (OUTPATIENT)
Dept: LAB | Age: 74
End: 2019-04-25
Attending: INTERNAL MEDICINE
Payer: MEDICARE

## 2019-04-25 DIAGNOSIS — I10 HYPERTENSION, ESSENTIAL: Primary | ICD-10-CM

## 2019-04-25 DIAGNOSIS — E78.00 PURE HYPERCHOLESTEROLEMIA: ICD-10-CM

## 2019-04-25 DIAGNOSIS — E66.9 OBESITY, UNSPECIFIED: ICD-10-CM

## 2019-04-25 PROCEDURE — 84165 PROTEIN E-PHORESIS SERUM: CPT | Performed by: PHYSICIAN ASSISTANT

## 2019-04-25 PROCEDURE — 80061 LIPID PANEL: CPT

## 2019-04-25 PROCEDURE — 83036 HEMOGLOBIN GLYCOSYLATED A1C: CPT

## 2019-04-25 PROCEDURE — 83883 ASSAY NEPHELOMETRY NOT SPEC: CPT | Performed by: PHYSICIAN ASSISTANT

## 2019-04-25 PROCEDURE — 86334 IMMUNOFIX E-PHORESIS SERUM: CPT | Performed by: PHYSICIAN ASSISTANT

## 2019-04-25 PROCEDURE — 84443 ASSAY THYROID STIM HORMONE: CPT

## 2019-05-16 ENCOUNTER — DOCUMENTATION (OUTPATIENT)
Dept: CARDIOLOGY | Age: 74
End: 2019-05-16

## 2019-06-03 ENCOUNTER — TELEPHONE (OUTPATIENT)
Dept: CARDIOLOGY | Age: 74
End: 2019-06-03

## 2019-08-07 NOTE — TELEPHONE ENCOUNTER
Medication: Carbidopa-Levodopa  mg     Date of last refill:.06/12/2018 with 3 addt refills # 540  [place]      Last office visit: 3/12/2019  Due back to clinic per last office note:  RTN in 8 months  Date next [facility] Female patient visits were with done with NP, PA or MA/PSRs during the entirety of the visit                     Instructions          Return in about 6 months (around 9/12/2019).

## 2019-10-22 ENCOUNTER — TELEPHONE (OUTPATIENT)
Dept: CARDIOLOGY | Age: 74
End: 2019-10-22

## 2019-10-24 RX ORDER — LATANOPROST 50 UG/ML
1 SOLUTION/ DROPS OPHTHALMIC
COMMUNITY
Start: 2019-01-22

## 2019-10-28 PROBLEM — I48.20 CHRONIC ATRIAL FIBRILLATION (CMD): Status: ACTIVE | Noted: 2018-02-13

## 2019-10-29 ENCOUNTER — OFFICE VISIT (OUTPATIENT)
Dept: CARDIOLOGY | Age: 74
End: 2019-10-29

## 2019-10-29 VITALS
SYSTOLIC BLOOD PRESSURE: 108 MMHG | WEIGHT: 214.1 LBS | HEART RATE: 55 BPM | BODY MASS INDEX: 29.97 KG/M2 | DIASTOLIC BLOOD PRESSURE: 64 MMHG | HEIGHT: 71 IN

## 2019-10-29 DIAGNOSIS — E78.00 PURE HYPERCHOLESTEROLEMIA: ICD-10-CM

## 2019-10-29 DIAGNOSIS — Z82.49 FAMILY HISTORY OF CARDIOVASCULAR DISEASE: ICD-10-CM

## 2019-10-29 DIAGNOSIS — I10 HYPERTENSION, BENIGN: ICD-10-CM

## 2019-10-29 DIAGNOSIS — I65.23 CAROTID ARTERY STENOSIS, ASYMPTOMATIC, BILATERAL: ICD-10-CM

## 2019-10-29 DIAGNOSIS — I48.20 CHRONIC ATRIAL FIBRILLATION (CMD): Primary | ICD-10-CM

## 2019-10-29 PROCEDURE — 99215 OFFICE O/P EST HI 40 MIN: CPT | Performed by: INTERNAL MEDICINE

## 2019-10-29 PROCEDURE — 3074F SYST BP LT 130 MM HG: CPT | Performed by: INTERNAL MEDICINE

## 2019-10-29 PROCEDURE — 3078F DIAST BP <80 MM HG: CPT | Performed by: INTERNAL MEDICINE

## 2019-10-29 RX ORDER — GABAPENTIN 300 MG/1
300 CAPSULE ORAL DAILY
COMMUNITY
End: 2020-08-04

## 2019-10-29 ASSESSMENT — PATIENT HEALTH QUESTIONNAIRE - PHQ9
1. LITTLE INTEREST OR PLEASURE IN DOING THINGS: NOT AT ALL
SUM OF ALL RESPONSES TO PHQ9 QUESTIONS 1 AND 2: 0
SUM OF ALL RESPONSES TO PHQ9 QUESTIONS 1 AND 2: 0
2. FEELING DOWN, DEPRESSED OR HOPELESS: NOT AT ALL

## 2020-01-01 ENCOUNTER — EXTERNAL RECORD (OUTPATIENT)
Dept: HEALTH INFORMATION MANAGEMENT | Facility: OTHER | Age: 75
End: 2020-01-01

## 2020-01-02 PROBLEM — R73.9 HYPERGLYCEMIA: Status: ACTIVE | Noted: 2020-01-02

## 2020-01-02 LAB
ABSOLUTE IMMATURE GRANULOCYTES (OFFPRE24): NORMAL
ALBUMIN SERPL-MCNC: 4.2 G/DL
ALBUMIN/GLOB SERPL: NORMAL {RATIO}
ALP SERPL-CCNC: 66 U/L
ALT SERPL-CCNC: <5 U/L
ANION GAP SERPL CALC-SCNC: NORMAL MMOL/L
AST SERPL-CCNC: 16 U/L
BASO+EOS+MONOS # BLD: NORMAL 10*3/UL
BASO+EOS+MONOS NFR BLD: NORMAL %
BASOPHILS # BLD: NORMAL 10*3/UL
BASOPHILS NFR BLD: NORMAL %
BILIRUB SERPL-MCNC: 1.12 MG/DL
BUN SERPL-MCNC: 22 MG/DL
BUN/CREAT SERPL: NORMAL
CALCIUM SERPL-MCNC: 9.3 MG/DL
CHLORIDE SERPL-SCNC: 102 MMOL/L
CHOLEST SERPL-MCNC: 170 MG/DL
CHOLEST/HDLC SERPL: NORMAL {RATIO}
CO2 SERPL-SCNC: NORMAL MMOL/L
CREAT SERPL-MCNC: 0.96 MG/DL
DIFFERENTIAL METHOD BLD: NORMAL
EOSINOPHIL # BLD: NORMAL 10*3/UL
EOSINOPHIL NFR BLD: NORMAL %
ERYTHROCYTE [DISTWIDTH] IN BLOOD: NORMAL %
EST. AVERAGE GLUCOSE BLD GHB EST-MCNC: NORMAL MG/DL
GLOBULIN SER-MCNC: NORMAL G/DL
GLUCOSE SERPL-MCNC: 113 MG/DL
HBA1C MFR BLD: 5.7 %
HBA1C MFR BLD: NORMAL % (ref 4.5–5.6)
HCT VFR BLD CALC: 50.4 %
HDLC SERPL-MCNC: 62 MG/DL
HGB BLD-MCNC: 16.1 G/DL
IMMATURE GRANULOCYTES (OFFPRE25): NORMAL
LDLC SERPL CALC-MCNC: 94 MG/DL
LENGTH OF FAST TIME PATIENT: NORMAL H
LENGTH OF FAST TIME PATIENT: YES H
LYMPHOCYTES # BLD: NORMAL 10*3/UL
LYMPHOCYTES NFR BLD: NORMAL %
MCH RBC QN AUTO: NORMAL PG
MCHC RBC AUTO-ENTMCNC: NORMAL G/DL
MCV RBC AUTO: NORMAL FL
MONOCYTES # BLD: NORMAL 10*3/UL
MONOCYTES NFR BLD: NORMAL %
MPV (OFFPRE2): NORMAL
NEUTROPHILS # BLD: NORMAL 10*3/UL
NEUTROPHILS NFR BLD: NORMAL %
NONHDLC SERPL-MCNC: NORMAL MG/DL
NRBC BLD MANUAL-RTO: NORMAL %
PLAT MORPH BLD: NORMAL
PLATELET # BLD: 192 10*3/UL
POTASSIUM SERPL-SCNC: 4.25 MMOL/L
PROT SERPL-MCNC: 7.4 G/DL
RBC # BLD: 5.76 10*6/UL
RBC MORPH BLD: NORMAL
SODIUM SERPL-SCNC: 142 MMOL/L
TRIGL SERPL-MCNC: 71 MG/DL
TSH SERPL-ACNC: 1.5 M[IU]/L
VLDLC SERPL CALC-MCNC: NORMAL MG/DL
WBC # BLD: 6.15 10*3/UL
WBC MORPH BLD: NORMAL

## 2020-01-06 ENCOUNTER — TELEPHONE (OUTPATIENT)
Dept: NEUROLOGY | Facility: CLINIC | Age: 75
End: 2020-01-06

## 2020-01-06 DIAGNOSIS — R46.89 BEHAVIORAL CHANGE: ICD-10-CM

## 2020-01-06 DIAGNOSIS — G20 PARKINSON'S DISEASE (HCC): Primary | ICD-10-CM

## 2020-01-06 PROBLEM — I48.20 CHRONIC ATRIAL FIBRILLATION (HCC): Status: ACTIVE | Noted: 2018-02-13

## 2020-01-06 NOTE — TELEPHONE ENCOUNTER
Talked with Nadine Leblanc, she was concern with Mine Aviles behavior. Mine Aviles is behavior is changing. He is getting angry, and aggressive. Wife is concern whether it is because of his medications or his Parkinson disease.     Will notify Dr Prasanna Alcocer and ask for recomm

## 2020-01-06 NOTE — TELEPHONE ENCOUNTER
Spoke with Mirlande Taylor;  Recommended Neuropsychological Evaluation.   Tell Trudi Milton that is is meant to measure \"baseline\" memory reaction time in relation to his Parkinson's so that he can be better conivnced to do it,  Results may guide us to treatment strategie

## 2020-01-06 NOTE — TELEPHONE ENCOUNTER
Call pts wife Roney Saxena in regards to pts medication. Pt has appt on 1/7/20 so wants a call back today before the appt.

## 2020-01-07 ENCOUNTER — OFFICE VISIT (OUTPATIENT)
Dept: NEUROLOGY | Facility: CLINIC | Age: 75
End: 2020-01-07
Payer: COMMERCIAL

## 2020-01-07 VITALS
BODY MASS INDEX: 29.96 KG/M2 | OXYGEN SATURATION: 98 % | HEIGHT: 71 IN | HEART RATE: 58 BPM | DIASTOLIC BLOOD PRESSURE: 72 MMHG | WEIGHT: 214 LBS | RESPIRATION RATE: 14 BRPM | SYSTOLIC BLOOD PRESSURE: 110 MMHG

## 2020-01-07 DIAGNOSIS — G20 PARKINSON'S DISEASE (HCC): Primary | ICD-10-CM

## 2020-01-07 DIAGNOSIS — R46.89 BEHAVIORAL CHANGE: ICD-10-CM

## 2020-01-07 DIAGNOSIS — G60.3 IDIOPATHIC PROGRESSIVE NEUROPATHY: ICD-10-CM

## 2020-01-07 PROCEDURE — 99213 OFFICE O/P EST LOW 20 MIN: CPT | Performed by: OTHER

## 2020-01-07 NOTE — TELEPHONE ENCOUNTER
YOLANDA De León regarding the NeuroPsych testing to make sure the pt did not have any further questions or concerns.

## 2020-01-07 NOTE — PROGRESS NOTES
[facility] with [address]  2/9/1945  Primary Care Provider:  Norma Francisco MD    1/7/2020  Accompanied visit: wife    () No.        66year old yo patient being seen for:  PD    T Identified this visit:   Parkinson's disease ([facility] [address]  (primary encounter diagnosis)  Idiopathic progressive neuropathy  Behavioral change    Discussion plus Diagnostics & Treatment Orders:  Continue CD/LD  Given the fact that there is no reason for him to co

## 2020-01-22 ENCOUNTER — TELEPHONE (OUTPATIENT)
Dept: CARDIOLOGY | Age: 75
End: 2020-01-22

## 2020-01-23 ENCOUNTER — CLINICAL ABSTRACT (OUTPATIENT)
Dept: CARDIOLOGY | Age: 75
End: 2020-01-23

## 2020-04-10 DIAGNOSIS — G20 PARKINSON'S DISEASE (HCC): Primary | ICD-10-CM

## 2020-04-10 NOTE — TELEPHONE ENCOUNTER
Medication: Carbidopa-Levodopa 25-100mg     Date of last refill: 8/7/2019 (#540/1)  [place]    Last office visit: 1/7/2020  Due back to clinic per last office note:  6 months  Date next office visit scheduled:     Fut

## 2020-05-12 ENCOUNTER — TELEPHONE (OUTPATIENT)
Dept: NEUROLOGY | Facility: CLINIC | Age: 75
End: 2020-05-12

## 2020-06-01 NOTE — TELEPHONE ENCOUNTER
Faxed OV notes from 5/4/2017 through 1/7/2020, including 5/31/217 EMG to Dr. Linsey Allison per [facility] request.    Fax receipt confirmed. [facility] sent to be scanned.

## 2020-06-01 NOTE — TELEPHONE ENCOUNTER
Received [facility] last week but unable to complete yet. Wife called on status. I apologized for the delay, working with limited staff at this time. I promised to get the records faxed today. Wife understood and thanked me.

## 2020-07-21 ENCOUNTER — OFFICE VISIT (OUTPATIENT)
Dept: NEUROLOGY | Facility: CLINIC | Age: 75
End: 2020-07-21
Payer: COMMERCIAL

## 2020-07-21 VITALS
RESPIRATION RATE: 16 BRPM | HEART RATE: 74 BPM | BODY MASS INDEX: 29.68 KG/M2 | DIASTOLIC BLOOD PRESSURE: 77 MMHG | WEIGHT: 212 LBS | HEIGHT: 71 IN | SYSTOLIC BLOOD PRESSURE: 147 MMHG | TEMPERATURE: 98 F

## 2020-07-21 DIAGNOSIS — G20 PARKINSON DISEASE (HCC): ICD-10-CM

## 2020-07-21 DIAGNOSIS — G20 PARKINSON'S DISEASE (HCC): Primary | ICD-10-CM

## 2020-07-21 PROCEDURE — 99213 OFFICE O/P EST LOW 20 MIN: CPT | Performed by: OTHER

## 2020-07-21 PROCEDURE — 3008F BODY MASS INDEX DOCD: CPT | Performed by: OTHER

## 2020-07-21 PROCEDURE — 3078F DIAST BP <80 MM HG: CPT | Performed by: OTHER

## 2020-07-21 PROCEDURE — 3077F SYST BP >= 140 MM HG: CPT | Performed by: OTHER

## 2020-07-21 NOTE — PROGRESS NOTES
[facility] with [address]  2/9/1945  Primary Care Provider:  Kulwinder De Los Santos MD    7/21/2020  Accompanied visit: wife    ( ) No.        66year old yo patient being seen for:  PD (primary encounter diagnosis)  Parkinson disease ([facility] [address]    Discussion plus Diagnostics & Treatment Orders:  Doing well keep the same sinemet doses        (x) Discussed potential side effects of any treatment relevant to above.   Includes explanation of tests

## 2020-07-31 RX ORDER — FOLIC ACID 1 MG/1
1 TABLET ORAL
COMMUNITY

## 2020-08-04 ENCOUNTER — V-VISIT (OUTPATIENT)
Dept: CARDIOLOGY | Age: 75
End: 2020-08-04

## 2020-08-04 VITALS — HEIGHT: 71 IN | BODY MASS INDEX: 28.28 KG/M2 | WEIGHT: 202 LBS

## 2020-08-04 DIAGNOSIS — Z82.49 FAMILY HISTORY OF CARDIOVASCULAR DISEASE: ICD-10-CM

## 2020-08-04 DIAGNOSIS — I48.20 CHRONIC ATRIAL FIBRILLATION (CMD): ICD-10-CM

## 2020-08-04 DIAGNOSIS — I65.23 CAROTID ARTERY STENOSIS, ASYMPTOMATIC, BILATERAL: Primary | ICD-10-CM

## 2020-08-04 DIAGNOSIS — I10 HYPERTENSION, BENIGN: ICD-10-CM

## 2020-08-04 DIAGNOSIS — E78.00 PURE HYPERCHOLESTEROLEMIA: ICD-10-CM

## 2020-08-04 DIAGNOSIS — Z87.891 HISTORY OF SMOKING: ICD-10-CM

## 2020-08-04 PROCEDURE — 99213 OFFICE O/P EST LOW 20 MIN: CPT | Performed by: INTERNAL MEDICINE

## 2020-08-04 ASSESSMENT — PATIENT HEALTH QUESTIONNAIRE - PHQ9
1. LITTLE INTEREST OR PLEASURE IN DOING THINGS: NOT AT ALL
CLINICAL INTERPRETATION OF PHQ9 SCORE: NO FURTHER SCREENING NEEDED
SUM OF ALL RESPONSES TO PHQ9 QUESTIONS 1 AND 2: 0
CLINICAL INTERPRETATION OF PHQ2 SCORE: NO FURTHER SCREENING NEEDED
SUM OF ALL RESPONSES TO PHQ9 QUESTIONS 1 AND 2: 0
2. FEELING DOWN, DEPRESSED OR HOPELESS: NOT AT ALL

## 2020-11-16 DIAGNOSIS — G20 PARKINSON'S DISEASE (HCC): ICD-10-CM

## 2020-11-16 NOTE — TELEPHONE ENCOUNTER
Medication: Carbidopa-Levodopa    Date of last refill: 4/10/2020 for #540/1 additional refill  [place]    Last office visit: 7/21/2020  Due back to clinic per last office note:  9 months  Date next office visit sched

## 2021-03-04 DIAGNOSIS — Z23 NEED FOR VACCINATION: ICD-10-CM

## 2021-03-10 DIAGNOSIS — Z23 NEED FOR VACCINATION: ICD-10-CM

## 2021-04-12 PROBLEM — R26.81 UNSTEADY GAIT: Status: RESOLVED | Noted: 2017-05-30 | Resolved: 2021-04-12

## 2021-04-16 PROBLEM — G89.29 CHRONIC MIDLINE LOW BACK PAIN WITHOUT SCIATICA: Status: ACTIVE | Noted: 2021-04-16

## 2021-04-16 PROBLEM — M54.50 CHRONIC MIDLINE LOW BACK PAIN WITHOUT SCIATICA: Status: ACTIVE | Noted: 2021-04-16

## 2021-04-20 ENCOUNTER — OFFICE VISIT (OUTPATIENT)
Dept: CARDIOLOGY | Age: 76
End: 2021-04-20

## 2021-04-20 VITALS
HEART RATE: 60 BPM | HEIGHT: 72 IN | WEIGHT: 200 LBS | DIASTOLIC BLOOD PRESSURE: 78 MMHG | SYSTOLIC BLOOD PRESSURE: 122 MMHG | BODY MASS INDEX: 27.09 KG/M2

## 2021-04-20 DIAGNOSIS — E78.00 PURE HYPERCHOLESTEROLEMIA: ICD-10-CM

## 2021-04-20 DIAGNOSIS — I10 HYPERTENSION, BENIGN: ICD-10-CM

## 2021-04-20 DIAGNOSIS — I48.20 CHRONIC ATRIAL FIBRILLATION (CMD): ICD-10-CM

## 2021-04-20 DIAGNOSIS — I65.23 CAROTID ARTERY STENOSIS, ASYMPTOMATIC, BILATERAL: Primary | ICD-10-CM

## 2021-04-20 PROCEDURE — 3078F DIAST BP <80 MM HG: CPT | Performed by: INTERNAL MEDICINE

## 2021-04-20 PROCEDURE — 99215 OFFICE O/P EST HI 40 MIN: CPT | Performed by: INTERNAL MEDICINE

## 2021-04-20 PROCEDURE — 3074F SYST BP LT 130 MM HG: CPT | Performed by: INTERNAL MEDICINE

## 2021-04-20 RX ORDER — LOSARTAN POTASSIUM 50 MG/1
50 TABLET ORAL DAILY
COMMUNITY
Start: 2021-04-12

## 2021-04-20 ASSESSMENT — PATIENT HEALTH QUESTIONNAIRE - PHQ9
SUM OF ALL RESPONSES TO PHQ9 QUESTIONS 1 AND 2: 0
CLINICAL INTERPRETATION OF PHQ9 SCORE: NO FURTHER SCREENING NEEDED
1. LITTLE INTEREST OR PLEASURE IN DOING THINGS: NOT AT ALL
2. FEELING DOWN, DEPRESSED OR HOPELESS: NOT AT ALL
CLINICAL INTERPRETATION OF PHQ2 SCORE: NO FURTHER SCREENING NEEDED
SUM OF ALL RESPONSES TO PHQ9 QUESTIONS 1 AND 2: 0

## 2021-05-03 ENCOUNTER — TELEPHONE (OUTPATIENT)
Dept: CARDIOLOGY | Age: 76
End: 2021-05-03

## 2021-05-17 PROBLEM — R26.81 GAIT INSTABILITY: Status: ACTIVE | Noted: 2021-05-17

## 2021-05-21 ENCOUNTER — HOSPITAL ENCOUNTER (OUTPATIENT)
Dept: CV DIAGNOSTICS | Age: 76
Discharge: HOME | End: 2021-05-21
Attending: INTERNAL MEDICINE
Payer: MEDICARE

## 2021-05-21 DIAGNOSIS — I48.20 CHRONIC ATRIAL FIBRILLATION (HCC): ICD-10-CM

## 2021-05-21 DIAGNOSIS — I10 BENIGN HYPERTENSION: ICD-10-CM

## 2021-05-21 PROCEDURE — 93306 TTE W/DOPPLER COMPLETE: CPT | Performed by: INTERNAL MEDICINE

## 2021-08-03 DIAGNOSIS — G20 PARKINSON'S DISEASE (HCC): ICD-10-CM

## 2021-08-03 NOTE — TELEPHONE ENCOUNTER
Medication: Carbidopa-Levodopa  mg     Date of last refill: 11/16/20 with 11 addt refills  [place]A     Last office visit: 7/21/2020  Due back to clinic per last office note:  9 months  Date next office visit sc

## 2021-12-05 DIAGNOSIS — G20 PARKINSON'S DISEASE (HCC): ICD-10-CM

## 2021-12-06 NOTE — TELEPHONE ENCOUNTER
Spoke with pt's wife Caryle Collard (per HIPAA OK), notified that pt overdue for F/U appt  Wife hung up beofre transferred to [facility] to schedule, will reach out again to schedule.

## 2021-12-06 NOTE — TELEPHONE ENCOUNTER
Medication: CARBIDOPA-LEVODOPA  MG     Date of last refill: 8/3/21 (#540/0R)  [place] N[place]A     Last office visit: 7/21/20  Due back to clinic per last office note:  9 mon  Date next office visit scheduled:    Future

## 2022-01-20 ENCOUNTER — TELEPHONE (OUTPATIENT)
Dept: NEUROLOGY | Facility: CLINIC | Age: 77
End: 2022-01-20

## 2022-01-20 ENCOUNTER — OFFICE VISIT (OUTPATIENT)
Dept: NEUROLOGY | Facility: CLINIC | Age: 77
End: 2022-01-20
Payer: COMMERCIAL

## 2022-01-20 VITALS
RESPIRATION RATE: 16 BRPM | HEART RATE: 82 BPM | WEIGHT: 190 LBS | DIASTOLIC BLOOD PRESSURE: 80 MMHG | BODY MASS INDEX: 27.2 KG/M2 | HEIGHT: 70 IN | SYSTOLIC BLOOD PRESSURE: 140 MMHG

## 2022-01-20 DIAGNOSIS — G20 PARKINSON'S DISEASE (HCC): Primary | ICD-10-CM

## 2022-01-20 PROCEDURE — 99213 OFFICE O/P EST LOW 20 MIN: CPT | Performed by: OTHER

## 2022-01-20 PROCEDURE — 3077F SYST BP >= 140 MM HG: CPT | Performed by: OTHER

## 2022-01-20 PROCEDURE — 3008F BODY MASS INDEX DOCD: CPT | Performed by: OTHER

## 2022-01-20 PROCEDURE — 3079F DIAST BP 80-89 MM HG: CPT | Performed by: OTHER

## 2022-01-20 RX ORDER — OXYBUTYNIN CHLORIDE 10 MG/1
10 TABLET, EXTENDED RELEASE ORAL DAILY
COMMUNITY

## 2022-01-20 NOTE — PROGRESS NOTES
[facility] with [address]  2/9/1945  Primary Care Provider:  Elin Agarwal MD    1/20/2022  Accompanied visit:  Wife   () No.      64year old yo patient being seen for:  PD    Pr BMI 27.26 kg/m²   Looks stated age  General Exam:  HENT:  pink conjunctiva anicteric sclerae  Neck no adenopathy, thyroid normal  Heart and Lungs:  normal  Extremities: no cyanosis, skin changes    NEURO  Resting tremor right more than left  Slight postu orally or in writing.   Services rendered are only within the scope of direct medical care

## 2022-01-20 NOTE — TELEPHONE ENCOUNTER
Spouse called to express her dissatisfaction with today's visit, 1/20/22. They waited 20 minutes in the exam room to be seen. The provider asked if they were vaccinated, when they replied no he became upset.   He informed them they were putting everyone at

## 2024-07-26 ENCOUNTER — HOSPITAL ENCOUNTER (EMERGENCY)
Age: 79
Discharge: HOME | End: 2024-07-26
Attending: EMERGENCY MEDICINE
Payer: MEDICARE

## 2024-07-26 ENCOUNTER — APPOINTMENT (OUTPATIENT)
Dept: GENERAL RADIOLOGY | Age: 79
End: 2024-07-26
Payer: MEDICARE

## 2024-07-26 VITALS
OXYGEN SATURATION: 96 % | SYSTOLIC BLOOD PRESSURE: 159 MMHG | DIASTOLIC BLOOD PRESSURE: 86 MMHG | HEART RATE: 66 BPM | RESPIRATION RATE: 18 BRPM | WEIGHT: 170 LBS | BODY MASS INDEX: 24 KG/M2

## 2024-07-26 DIAGNOSIS — R07.89 CHEST PAIN, ATYPICAL: Primary | ICD-10-CM

## 2024-07-26 LAB
ALBUMIN SERPL-MCNC: 3.9 G/DL (ref 3.4–5)
ALBUMIN/GLOB SERPL: 1.1 {RATIO} (ref 1–2)
ALP LIVER SERPL-CCNC: 85 U/L
ALT SERPL-CCNC: 9 U/L
ANION GAP SERPL CALC-SCNC: 3 MMOL/L (ref 0–18)
AST SERPL-CCNC: 12 U/L (ref 15–37)
BASOPHILS # BLD AUTO: 0.03 X10(3) UL (ref 0–0.2)
BASOPHILS NFR BLD AUTO: 0.6 %
BILIRUB SERPL-MCNC: 0.8 MG/DL (ref 0.1–2)
BUN BLD-MCNC: 15 MG/DL (ref 9–23)
CALCIUM BLD-MCNC: 9.3 MG/DL (ref 8.5–10.1)
CHLORIDE SERPL-SCNC: 105 MMOL/L (ref 98–112)
CO2 SERPL-SCNC: 30 MMOL/L (ref 21–32)
CREAT BLD-MCNC: 0.97 MG/DL
D DIMER PPP FEU-MCNC: <0.27 UG/ML FEU (ref ?–0.81)
EGFRCR SERPLBLD CKD-EPI 2021: 78 ML/MIN/1.73M2 (ref 60–?)
EOSINOPHIL # BLD AUTO: 0.12 X10(3) UL (ref 0–0.7)
EOSINOPHIL NFR BLD AUTO: 2.2 %
ERYTHROCYTE [DISTWIDTH] IN BLOOD BY AUTOMATED COUNT: 14.4 %
GLOBULIN PLAS-MCNC: 3.7 G/DL (ref 2.8–4.4)
GLUCOSE BLD-MCNC: 102 MG/DL (ref 70–99)
HCT VFR BLD AUTO: 48.4 %
HGB BLD-MCNC: 16.2 G/DL
IMM GRANULOCYTES # BLD AUTO: 0.03 X10(3) UL (ref 0–1)
IMM GRANULOCYTES NFR BLD: 0.6 %
LYMPHOCYTES # BLD AUTO: 1.26 X10(3) UL (ref 1–4)
LYMPHOCYTES NFR BLD AUTO: 23.3 %
MCH RBC QN AUTO: 29 PG (ref 26–34)
MCHC RBC AUTO-ENTMCNC: 33.5 G/DL (ref 31–37)
MCV RBC AUTO: 86.6 FL
MONOCYTES # BLD AUTO: 0.48 X10(3) UL (ref 0.1–1)
MONOCYTES NFR BLD AUTO: 8.9 %
NEUTROPHILS # BLD AUTO: 3.48 X10 (3) UL (ref 1.5–7.7)
NEUTROPHILS # BLD AUTO: 3.48 X10(3) UL (ref 1.5–7.7)
NEUTROPHILS NFR BLD AUTO: 64.4 %
NT-PROBNP SERPL-MCNC: 1478 PG/ML (ref ?–450)
OSMOLALITY SERPL CALC.SUM OF ELEC: 287 MOSM/KG (ref 275–295)
PLATELET # BLD AUTO: 198 10(3)UL (ref 150–450)
POTASSIUM SERPL-SCNC: 3.9 MMOL/L (ref 3.5–5.1)
PROT SERPL-MCNC: 7.6 G/DL (ref 6.4–8.2)
RBC # BLD AUTO: 5.59 X10(6)UL
SODIUM SERPL-SCNC: 138 MMOL/L (ref 136–145)
TROPONIN I SERPL HS-MCNC: 28 NG/L
TROPONIN I SERPL HS-MCNC: 30 NG/L
WBC # BLD AUTO: 5.4 X10(3) UL (ref 4–11)

## 2024-07-26 PROCEDURE — 99284 EMERGENCY DEPT VISIT MOD MDM: CPT

## 2024-07-26 PROCEDURE — 36415 COLL VENOUS BLD VENIPUNCTURE: CPT

## 2024-07-26 PROCEDURE — 80053 COMPREHEN METABOLIC PANEL: CPT

## 2024-07-26 PROCEDURE — 93010 ELECTROCARDIOGRAM REPORT: CPT

## 2024-07-26 PROCEDURE — 99285 EMERGENCY DEPT VISIT HI MDM: CPT

## 2024-07-26 PROCEDURE — 85025 COMPLETE CBC W/AUTO DIFF WBC: CPT | Performed by: EMERGENCY MEDICINE

## 2024-07-26 PROCEDURE — 84484 ASSAY OF TROPONIN QUANT: CPT

## 2024-07-26 PROCEDURE — 85379 FIBRIN DEGRADATION QUANT: CPT | Performed by: EMERGENCY MEDICINE

## 2024-07-26 PROCEDURE — 71045 X-RAY EXAM CHEST 1 VIEW: CPT | Performed by: EMERGENCY MEDICINE

## 2024-07-26 PROCEDURE — 83880 ASSAY OF NATRIURETIC PEPTIDE: CPT | Performed by: EMERGENCY MEDICINE

## 2024-07-26 PROCEDURE — 85025 COMPLETE CBC W/AUTO DIFF WBC: CPT

## 2024-07-26 PROCEDURE — 80053 COMPREHEN METABOLIC PANEL: CPT | Performed by: EMERGENCY MEDICINE

## 2024-07-26 PROCEDURE — 93005 ELECTROCARDIOGRAM TRACING: CPT

## 2024-07-26 PROCEDURE — 84484 ASSAY OF TROPONIN QUANT: CPT | Performed by: EMERGENCY MEDICINE

## 2024-07-26 NOTE — ED PROVIDER NOTES
79-year-old, history of Parkinson's, A-fib, hypertension, off of metoprolol for the past 6 months, baseline heart rate 48-51, had a 20-minute episode earlier today where he felt pain in the left chest, shortness of breath, felt lightheaded.  After the and before this episode patient felt his usual self.  He says now he feels like it never happened.  He would like to go to a picnic but thought he should get checked out first.  Similar episode in April.

## 2024-07-27 LAB
Q-T INTERVAL: 432 MS
QRS DURATION: 70 MS
QTC CALCULATION (BEZET): 401 MS
R AXIS: 61 DEGREES
T AXIS: -4 DEGREES
VENTRICULAR RATE: 52 BPM

## 2024-07-27 NOTE — ED PROVIDER NOTES
Patient Seen in: [facility] In [place]      History     Chief Complaint   Patient presents with    Difficulty Breathing     Stated Complaint: sob for about 15 minutes.    Subjective:   HPI    CHIEF COMPLAINT: Shortness of breath that started 1 hour prior to arrival     HISTORY OF PRESENT ILLNESS: Patient is an 79-year-old male with medical history of Parkinson's, dementia, A-fib, hypertension, presenting for evaluation of a sensation of feeling short of breath for the last 1 hour.  Patient states symptoms came on suddenly.  He felt hot and short of breath.  Denied any pain in the chest.  States he is feeling better now.  Has had this happen before according to his wife.  They have been seen in the emergency department for the same in April 2024.  Right now patient states it feels like his symptoms have resolved.     REVIEW OF SYSTEMS:  Constitutional: no fever, no chills  Eyes: no discharge  ENT: no sore throat  Cardiovascular: no chest pain, no palpitations  Respiratory: As above  Gastrointestinal: no abdominal pain, no vomiting  Genitourinary: no hematuria  Musculoskeletal: no back pain  Skin: no rashes  Neurological: no headache     Otherwise a complete review of systems was obtained and other than the HPI was negative     The patient's medication list, past medical history and social history elements is as listed in today's nurse's notes are reviewed and agree. The patient's family history is reviewed and is noncontributory to the presenting problem, except as indicated as above.    Objective:   Past Medical History:    A-fib (HCC)    Diverticula of colon    Elevated serum immunoglobulin free light chain level    Hearing impairment    bilateral hearing aids    High blood pressure    High cholesterol    Hypertrophy of prostate without urinary obstruction and other lower urinary tract symptoms (LUTS)    Impotence of organic origin    Kappa light chain disease (HCC)    KIDNEY STONE    Large  fiber neuropathy    MGUS (monoclonal gammopathy of unknown significance)    Nonspecific abnormal results of cardiovascular function study    Calcium Score 142    Paralytic strabismus, third or oculomotor nerve palsy, partial    Parkinson disease (HCC)    Unsteady gait              No pertinent past surgical history.              Social History     Socioeconomic History    Marital status:    Tobacco Use    Smoking status: Former     Current packs/day: 0.00     Average packs/day: 1 pack/day for 27.0 years (27.0 ttl pk-yrs)     Types: Cigarettes     Start date: 1953     Quit date: 1980     Years since quittin.5    Smokeless tobacco: Former   Substance and Sexual Activity    Alcohol use: Yes     Comment: 1-2 weekly    Drug use: No    Sexual activity: Yes     Partners: Female   Other Topics Concern    Caffeine Concern No    Exercise Yes     Comment: walking & Treadmill     Social Determinants of Health      Received from [facility]    Social Connections    Received from [facility]    Housing Stability              Review of Systems    Positive for stated Chief Complaint: Difficulty Breathing    Other systems are as noted in HPI.  Constitutional and vital signs reviewed.      All other systems reviewed and negative except as noted above.    Physical Exam     ED Triage Vitals [24 1658]   BP (!) 178/104   Pulse 67   Resp 16   Temp    Temp src    SpO2 97 %   O2 Device None (Room air)       Current Vitals:   Vital Signs  BP: (!) 160/91  Pulse: 51  Resp: 14    Oxygen Therapy  SpO2: 97 %  O2 Device: None (Room air)            Physical Exam    Vital signs and nursing notes reviewed  General Appearance: No acute distress  Neurological:  A&Ox3,  Gait normal.  Psychiatric: calm and cooperative  Respiratory: CTAB  Cardiovascular: Irregular rate and rhythm  Musculoskeletal: Extremities are symmetrical, no pitting edema bilaterally  Skin:  warm and dry, no rashes.        ED Course     Labs Reviewed   COMP METABOLIC PANEL (14) - Abnormal; Notable for the following components:       Result Value    Glucose 102 (*)     AST 12 (*)     ALT 9 (*)     All other components within normal limits   PRO BETA NATRIURETIC PEPTIDE - Abnormal; Notable for the following components:    Pro-Beta Natriuretic Peptide 1,478 (*)     All other components within normal limits   TROPONIN I HIGH SENSITIVITY - Normal   D-DIMER - Normal   TROPONIN I HIGH SENSITIVITY - Normal   CBC WITH DIFFERENTIAL WITH PLATELET    Narrative:     The following orders were created for panel order CBC With Differential With Platelet.  Procedure                               Abnormality         Status                     ---------                               -----------         ------                     CBC W/ DIFFERENTIAL[[id number]]                              Final result                 Please view results for these tests on the individual orders.   RAINBOW DRAW LAVENDER   RAINBOW DRAW LIGHT GREEN   RAINBOW DRAW BLUE   CBC W/ DIFFERENTIAL     EKG    Rate, intervals and axes as noted on EKG Report.  Rate: 52 bpm  Rhythm: Atrial Fibrillation  Reading: A-fib with a ventricular rate of 52 bpm; when compared to previous EKG from 2018, no significant changes are noted.              ED Course as of 07/26/24 2000  ------------------------------------------------------------  Time: 07/26 1912  Value: XR CHEST AP PORTABLE  (CPT=71045)  Comment: (Reviewed)     XR CHEST AP PORTABLE  (CPT=71045)    Result Date: 7/26/2024  PROCEDURE:  XR CHEST AP PORTABLE  (CPT=71045)  TECHNIQUE:  AP chest radiograph was obtained.  COMPARISON:  [facility], XR, XR CHEST PA + LAT CHEST (CPT=71020), 4/24/2015, 2:57 PM.  INDICATIONS:  sob for about 15 minutes.  PATIENT STATED HISTORY: (As transcribed by Technologist)  Pt c/o sob for past 15 mins.                 CONCLUSION:   Stable cardiac and mediastinal contours.  No pulmonary edema or focal  airspace consolidation.  Mediastinal and right hilar fabian calcification consist with chronic granulomatous disease.  The pleural spaces are clear.  No acute osseous findings.   LOCATION:  [id number]      Dictated by (CST): Narendra Marcelo MD on 7/26/2024 at 5:26 PM     Finalized by (CST): Narendar Marcelo MD on 7/26/2024 at 5:27 PM            I personally reviewed the chest x-ray images.  No consolidation is appreciated.    Differential diagnosis is cardiac arrhythmia, heart failure, COVID, PE,      MDM      This 79-year-old male presents for evaluation of feeling short of breath prior to arrival.  Symptoms seem to have stabilized by now.  He had an IV line established and blood work was performed.  He is noted to have a CBC with a normal white blood cell count of 5000.  Hemoglobin 16.2 and hematocrit 48.4.  Platelet count normal at 198.  CMP shows a glucose of 102, AST of 12 and ALT of 9, otherwise unremarkable.  Troponin negative.  D-dimer negative.  BNP 1400.  No previous BNP for comparison.  Chest x-ray without consolidation or volume overload.  Patient stayed for 3 hours of observation in the emergency department.  He was stable during his time in the emergency department.  Lab work reviewed with family by Dr. Haque.  Will discharge home with outpatient follow-up with [facility].  They should return for reevaluation if there are any new, changing or worsening symptoms.  This patient was seen and examined with Dr. Haque, please see her note for further details                               Medical Decision Making  Amount and/or Complexity of Data Reviewed  Labs: ordered. Decision-making details documented in ED Course.  Radiology: ordered and independent interpretation performed. Decision-making details documented in ED Course.  ECG/medicine tests: ordered and independent interpretation performed. Decision-making details documented in ED Course.        Disposition and Plan     Clinical Impression:  1. Chest pain, atypical          Disposition:  Discharge  7/26/2024  7:56 pm    Follow-up:  [address] [facility] [address] [phone]  Follow up            Medications Prescribed:  Current Discharge Medication List

## 2024-07-27 NOTE — DISCHARGE INSTRUCTIONS
Follow-up with [facility] for further evaluation, possible echocardiogram.  Return for any problems

## 2024-07-27 NOTE — ED QUICK NOTES
Ambulated Pt and then placed back on O2 sat.  Pt was 96% room air after ambulation with no complaints of shortness of breath or chest pain.

## 2024-10-29 ENCOUNTER — OFF PREMISE (OUTPATIENT)
Dept: HEALTH INFORMATION MANAGEMENT | Facility: OTHER | Age: 79
End: 2024-10-29

## 2024-10-29 PROCEDURE — 93010 ELECTROCARDIOGRAM REPORT: CPT | Performed by: INTERNAL MEDICINE

## (undated) DEVICE — TRAY FOLEY 16F IC URINMETER

## (undated) DEVICE — KENDALL SCD EXPRESS SLEEVES, KNEE LENGTH, MEDIUM: Brand: KENDALL SCD

## (undated) DEVICE — SPONGE STICK WITH PVP-I: Brand: KENDALL

## (undated) DEVICE — MEGA SUTURECUT ND: Brand: ENDOWRIST

## (undated) DEVICE — MONOPOLAR CURVED SCISSORS: Brand: ENDOWRIST

## (undated) DEVICE — GENERAL LAPAROS CDS-LF: Brand: MEDLINE INDUSTRIES, INC.

## (undated) DEVICE — GOWN,SIRUS,FABRIC-REINFORCED,X-LARGE: Brand: MEDLINE

## (undated) DEVICE — COLUMN DRAPE

## (undated) DEVICE — PROGRASP FORCEPS: Brand: ENDOWRIST

## (undated) DEVICE — ARM DRAPE

## (undated) DEVICE — INSUFFLATION NEEDLE TO ESTABLISH PNEUMOPERITONEUM.: Brand: INSUFFLATION NEEDLE

## (undated) DEVICE — BLADELESS OBTURATOR: Brand: WECK VISTA

## (undated) DEVICE — SUTURE VLOC 90 2-0 9\" 2145

## (undated) DEVICE — UNDYED BRAIDED (POLYGLACTIN 910), SYNTHETIC ABSORBABLE SUTURE: Brand: COATED VICRYL

## (undated) DEVICE — STERILE POLYISOPRENE POWDER-FREE SURGICAL GLOVES: Brand: PROTEXIS

## (undated) DEVICE — CANNULA SEAL

## (undated) DEVICE — PLUMEPORT ACTIV LAPAROSCOPIC SMOKE FILTRATION DEVICE: Brand: PLUMEPORT ACTIVE

## (undated) DEVICE — VISUALIZATION SYSTEM: Brand: CLEARIFY

## (undated) DEVICE — 40580 - THE PINK PAD - ADVANCED TRENDELENBURG POSITIONING KIT: Brand: 40580 - THE PINK PAD - ADVANCED TRENDELENBURG POSITIONING KIT

## (undated) DEVICE — CHLORAPREP 26ML APPLICATOR

## (undated) DEVICE — PAD SACRAL SPAN AID

## (undated) DEVICE — TIP COVER ACCESSORY

## (undated) DEVICE — COVER,MAYO STAND,STERILE: Brand: MEDLINE

## (undated) NOTE — ED AVS SNAPSHOT
Mr. Glenn Chamberlain   MRN: [id number]    Department:  THE [facility] Emergency Department in [place]   Date of Visit:  9/22/2017           Disclosure     Insurance plans vary and the physician(s) referred by the ER may not be covered by your plan.  Please cont If you have been prescribed any medication(s), please fill your prescription right away and begin taking the medication(s) as directed    If the emergency physician has read X-rays, these will be re-interpreted by a radiologist.  If there is a significant

## (undated) NOTE — MR AVS SNAPSHOT
[facility] [address] [facility] [address] [phone]               Thank you for choosing us for your health care visit with Layo Dalal MD.  We are glad to serve you and happy to provide you with this summary of your v of individual in advance and they must present an ID as well. ? The name of the person picking up your prescription must be documented in your chart.   Scheduling Tests    If your physician has ordered radiology tests such as MRI or CT scans, do not schedu Atorvastatin Calcium 20 MG Tabs   Take 1 tablet (20 mg total) by mouth nightly.    Commonly known as:  LIPITOR           carbidopa-levodopa  MG Tabs   TAKE ONE TABLET BY MOUTH THREE TIMES DAILY   Commonly known as:  SINEMET           finasteride 5 MG Summaries. If you've been to the Emergency Department or your doctor's office, you can view your past visit information in [employer] by going to Visits < Visit Summaries. [employer] questions? Call [phone] for help.   [employer] is NOT to be used for urge

## (undated) NOTE — LETTER
17    Patient: Joe Lemus  : 1945 Visit date: 2017    Dear  Dr. Glynn Green MD,    Joe Lemus presented to my office today in follow-up of an ER visit regarding a right inguinal hernia. Please find my assessment and plan below. to coordinate with his cardiologist Dr. Noe Perkins. Ideally we would hold Xarelto for a minimum of 3 days prior to surgery and an additional 2 days after surgery, or longer if medically acceptable.           Sincerely,       Andy Sandifer, MD   CC: No

## (undated) NOTE — MR AVS SNAPSHOT
[address] [facility] [address] [phone]               Thank you for choosing us for your health care visit with Leticia Mariano MD.  We are glad to serve you and happy to provide you with this summary of your v Medical Issues Discussed Today     Large fiber neuropathy      Instructions and Information about Your Health     None      Allergies as of May 31, 2017     No Known Allergies                   Current Medications          This list is accurate as of: 5/31 office, you can view your past visit information in [employer] by going to Visits < Visit Summaries. [employer] questions? Call [phone] for help. [employer] is NOT to be used for urgent needs. For medical emergencies, dial 911.            Visit [facility]

## (undated) NOTE — MR AVS SNAPSHOT
[facility] [place]  [address] [facility] [address] [phone]               Thank you for choosing us for your health care visit with Inderjit Jennings MD.  We are glad to serve you and happy to provide you with this summary of your v Or Call [phone]      Referral Orders      Normal Orders This Visit    PHYSICAL THERAPY EXTERNAL [249602 CPT(R)]  Order #:  [id number]         **REFERRAL REQUEST**    Your physician has referred you to a specialist.  Your physician or the clinic bhavna ? Refills are not addressed on weekends; covering physicians do not authorize routine medications on weekends. ? No narcotics or controlled substances are refilled after noon on Fridays or by on call physicians.   ? By law, narcotics cannot be faxed or martha approved and is a COVERED benefit. Although the [facility] staff does its due diligence, the insurance carrier gives the disclaimer that \"Although the procedure is authorized, this does not guarantee payment. \"    Ultimately the patient is responsible for payme view more details from this visit by going to [email or web address]. If you've recently had a stay at the Hospital you can access your discharge instructions in [email or web address] by going to Visits < Admission Summaries.  If you've been to the Emergency Depar